# Patient Record
Sex: MALE | Race: WHITE | NOT HISPANIC OR LATINO | Employment: FULL TIME | URBAN - METROPOLITAN AREA
[De-identification: names, ages, dates, MRNs, and addresses within clinical notes are randomized per-mention and may not be internally consistent; named-entity substitution may affect disease eponyms.]

---

## 2018-01-15 NOTE — PROGRESS NOTES
Assessment    1  Screening for depression (V79 0) (Z13 89)   2  GERD (gastroesophageal reflux disease) (530 81) (K21 9)   3  Encounter for screening colonoscopy (V76 51) (Z12 11)    Plan   Encounter for screening colonoscopy    · (1) COMPREHENSIVE METABOLIC PANEL; Status:Active; Requested for:[id number];    · (1) LIPID PANEL FASTING W DIRECT LDL REFLEX; Status:Active; Requested  for:[id number];    · 3 - Sandy Batista MD, [facility]ide  (Gastroenterology) Physician Referral  Consult  Status: Hold For -  Scheduling  Requested for: [id number]  are Referring to a non- Preferred Provider : Established Patient  Care Summary provided  : Yes  Need for prophylactic vaccination and inoculation against influenza    · Fluzone Quadrivalent 0 5 ML Intramuscular Suspension    *VB-Depression Screening; Status:Resulted - Requires Verification,Retrospective Authorization;   Done: [id number] 12:00AM  EFL:[id number]; Last Updated By:Taya Chairez; 12/19/2016 9:25:03 AM;Ordered; For:Screening for depression; Ordered By:Sharron Chan;      Discussion/Summary  Impression: health maintenance visit  Currently, he eats an adequate diet  Chief Complaint  CPE and flu vaccine      History of Present Illness  HM, Adult Male: The patient is being seen for a health maintenance evaluation  Social History: Household members include spouse  He is   Work status: working full time  The patient has never smoked cigarettes  He reports rare alcohol use  General Health: The patient's health since the last visit is described as fair  Screening:      Review of Systems    Constitutional: No fever or chills, feels well, no tiredness, no recent weight gain or weight loss  Eyes: No complaints of eye pain, no red eyes, no discharge from eyes, no itchy eyes  ENT: no complaints of earache, no hearing loss, no nosebleeds, no nasal discharge, no sore throat, no hoarseness     Cardiovascular: No complaints of slow heart rate, no fast heart rate, no chest pain, no palpitations, no leg claudication, no lower extremity  Respiratory: No complaints of shortness of breath, no wheezing, no cough, no SOB on exertion, no orthopnea or PND  Active Problems    1  Abdominal pain (789 00) (R10 9)   2  Abrasion Or Friction Burn (919 0)   3  Acute bronchitis (466 0) (J20 9)   4  Acute sinusitis (461 9) (J01 90)   5  Allergic rhinitis (477 9) (J30 9)   6  Allergic urticaria (708 0) (L50 0)   7  Ankle pain, unspecified laterality   8  Ankle Sprain (845 00)   9  Arthralgia of left ankle or foot (719 47) (M25 572)   10  Arthralgia Of The Humerus / Elbow (719 42)   11  Arthralgia Of The Ulna / Radius / Wrist (719 43)   12  Chronic rhinitis (472 0) (J31 0)   13  Dermatitis (692 9) (L30 9)   14  Eczema (692 9) (L30 9)   15  Encounter for screening colonoscopy (V76 51) (Z12 11)   16  Fecal soiling (787 62) (R15 1)   17  GERD (gastroesophageal reflux disease) (530 81) (K21 9)   18  Internal Hemorrhoids (455 0)   19  Need for prophylactic vaccination and inoculation against influenza (V04 81) (Z23)   20  Obstructive sleep apnea (327 23) (G47 33)   21  Penis disorder (607 9) (N48 9)   22  Preoperative clearance (V72 84) (Z01 818)   23  Screening for depression (V79 0) (Z13 89)   24  Surgical procedure planned    Family History  Mother    · No pertinent family history  Family History    · Family history of Thyroid Disorder (V18 19)    Social History    · Denied: History of Alcohol Use (History)   · Denied: History of Drug Use   · Never A Smoker    Current Meds   1  No Reported Medications  Requested for: 11Aug2016 Recorded    Allergies    1  No Known Drug Allergies    2   No Known Latex Allergies    Vitals   Recorded: 03TEZ7207 05:36PM   Temperature 97 4 F   Heart Rate 66   Respiration 18   Systolic 437   Diastolic 70   Height 5 ft 9 in   Weight 267 lb 2 oz   BMI Calculated 39 45   BSA Calculated 2 34   O2 Saturation 97     Physical Exam    Constitutional   General appearance: No acute distress, well appearing and well nourished  Head and Face   Head and face: Normal     Palpation of the face and sinuses: No sinus tenderness  Eyes   Conjunctiva and lids: No erythema, swelling or discharge  Ears, Nose, Mouth, and Throat   External inspection of ears and nose: Normal     Otoscopic examination: Tympanic membranes translucent with normal light reflex  Canals patent without erythema  Hearing: Normal     Nasal mucosa, septum, and turbinates: Normal without edema or erythema  Lips, teeth, and gums: Normal, good dentition  Oropharynx: Normal with no erythema, edema, exudate or lesions  Neck   Thyroid: Normal, no thyromegaly  Pulmonary   Respiratory effort: No increased work of breathing or signs of respiratory distress  Auscultation of lungs: Clear to auscultation  Cardiovascular   Auscultation of heart: Normal rate and rhythm, normal S1 and S2, no murmurs  Abdomen   Abdomen: Non-tender, no masses  Liver and spleen: No hepatomegaly or splenomegaly  Anus, perineum, and rectum: Normal sphincter tone, no masses, no prolapse  Stool sample for occult blood: Negative  The stool was negative for occult blood  Genitourinary   Scrotal contents: Normal testes, no masses  Penis: Normal, no lesions  Digital rectal exam of prostate: Normal size, no masses  Results/Data  PHQ-2 Adult Depression Screening 86THK6928 05:25PM User, s     Test Name Result Flag Reference   PHQ-2 Adult Depression Score 0     Over the last two weeks, how often have you been bothered by any of the following problems? Little interest or pleasure in doing things: Not at all - 0  Feeling down, depressed, or hopeless: Not at all - 0   PHQ-2 Adult Depression Screening Negative         Signatures   Electronically signed by :  SANDHYA Clifford ; Dec 19 2016  8:23PM EST                       (Author)

## 2018-06-28 ENCOUNTER — OFFICE VISIT (OUTPATIENT)
Dept: FAMILY MEDICINE CLINIC | Facility: CLINIC | Age: 58
End: 2018-06-28
Payer: COMMERCIAL

## 2018-06-28 VITALS
HEART RATE: 77 BPM | SYSTOLIC BLOOD PRESSURE: 150 MMHG | DIASTOLIC BLOOD PRESSURE: 86 MMHG | HEIGHT: 69 IN | RESPIRATION RATE: 20 BRPM | WEIGHT: 280 LBS | OXYGEN SATURATION: 97 % | BODY MASS INDEX: 41.47 KG/M2

## 2018-06-28 DIAGNOSIS — M25.562 CHRONIC PAIN OF LEFT KNEE: Primary | ICD-10-CM

## 2018-06-28 DIAGNOSIS — G89.29 CHRONIC PAIN OF LEFT KNEE: Primary | ICD-10-CM

## 2018-06-28 PROCEDURE — 99213 OFFICE O/P EST LOW 20 MIN: CPT | Performed by: FAMILY MEDICINE

## 2018-06-28 PROCEDURE — 3008F BODY MASS INDEX DOCD: CPT | Performed by: FAMILY MEDICINE

## 2018-06-28 RX ORDER — NABUMETONE 500 MG/1
500 TABLET, FILM COATED ORAL 2 TIMES DAILY
Qty: 20 TABLET | Refills: 0 | Status: SHIPPED | OUTPATIENT
Start: 2018-06-28 | End: 2018-10-31 | Stop reason: SDUPTHER

## 2018-06-28 RX ORDER — MELOXICAM 15 MG/1
15 TABLET ORAL DAILY
Refills: 0 | COMMUNITY
Start: 2018-06-11 | End: 2018-06-28

## 2018-06-29 NOTE — PROGRESS NOTES
Assessment/Plan:    No problem-specific Assessment & Plan notes found for this encounter  Diagnoses and all orders for this visit:    Chronic pain of left knee  -     MRI knee left  wo contrast; Future  -     nabumetone (RELAFEN) 500 mg tablet; Take 1 tablet (500 mg total) by mouth 2 (two) times a day    Other orders  -     Discontinue: meloxicam (MOBIC) 15 mg tablet; Take 15 mg by mouth daily          Subjective:      Patient ID: Collins Larios is a 62 y o  male      About 4-5 months ago Eugenia Hamm was walking in next step to enter into a hole in the ground on his in-laws farm he's had problems with his knee ever since then recently it has gotten more painful with trouble with extension no buckling however no swelling and no redness  Did go to an urgent care they gave him Mobic and that has not relieved any of the discomfort        The following portions of the patient's history were reviewed and updated as appropriate: past family history, past social history, past surgical history and problem list     Review of Systems   Musculoskeletal:        Please see chief complaint         Objective:      /86   Pulse 77   Resp 20   Ht 5' 9" (1 753 m)   Wt 127 kg (280 lb)   SpO2 97%   BMI 41 35 kg/m²          Physical Exam   Musculoskeletal:   Patient has limited range of motion in extension and he has a positive Boyd's test my concern is the fraying or partial tear of the medial meniscus

## 2018-07-02 ENCOUNTER — TELEPHONE (OUTPATIENT)
Dept: FAMILY MEDICINE CLINIC | Facility: CLINIC | Age: 58
End: 2018-07-02

## 2018-07-02 NOTE — TELEPHONE ENCOUNTER
DR Sandi Vazquez - [facility] [employer] IS REQUESTING A PEER TO PEER WITH YOU FOR AUTH FOR MRI KNEE   THEY ARE LOOKING FOR MORE CLINICAL INFO, CONSERVATIVE TREATMENT, XRAYS, ORTHO EVAL, ETC   PHONE NO  IS [phone], CASE NO  [id number]  CAN SOMEONE PLEASE TAKE CARE OF THIS

## 2018-07-05 ENCOUNTER — TELEPHONE (OUTPATIENT)
Dept: FAMILY MEDICINE CLINIC | Facility: CLINIC | Age: 58
End: 2018-07-05

## 2018-07-05 NOTE — TELEPHONE ENCOUNTER
DR Michelle Parikh [employer] [employer] DENIED HIS MRI  THEY ARE REQUESTING AN XRAY, CONSERVATIVE TREATMENT, ORTHO EVAL     WHAT DO YOU WANT TO DO

## 2018-10-31 ENCOUNTER — OFFICE VISIT (OUTPATIENT)
Dept: FAMILY MEDICINE CLINIC | Facility: CLINIC | Age: 58
End: 2018-10-31
Payer: COMMERCIAL

## 2018-10-31 VITALS
OXYGEN SATURATION: 97 % | SYSTOLIC BLOOD PRESSURE: 110 MMHG | WEIGHT: 279.38 LBS | RESPIRATION RATE: 20 BRPM | TEMPERATURE: 97.2 F | BODY MASS INDEX: 41.26 KG/M2 | HEART RATE: 65 BPM | DIASTOLIC BLOOD PRESSURE: 72 MMHG

## 2018-10-31 DIAGNOSIS — Z23 NEED FOR IMMUNIZATION AGAINST INFLUENZA: Primary | ICD-10-CM

## 2018-10-31 DIAGNOSIS — G89.29 CHRONIC PAIN OF LEFT KNEE: ICD-10-CM

## 2018-10-31 DIAGNOSIS — M25.562 CHRONIC PAIN OF LEFT KNEE: ICD-10-CM

## 2018-10-31 PROCEDURE — 1036F TOBACCO NON-USER: CPT | Performed by: FAMILY MEDICINE

## 2018-10-31 PROCEDURE — 99213 OFFICE O/P EST LOW 20 MIN: CPT | Performed by: FAMILY MEDICINE

## 2018-10-31 PROCEDURE — 90471 IMMUNIZATION ADMIN: CPT | Performed by: FAMILY MEDICINE

## 2018-10-31 PROCEDURE — 90682 RIV4 VACC RECOMBINANT DNA IM: CPT | Performed by: FAMILY MEDICINE

## 2018-10-31 RX ORDER — NABUMETONE 500 MG/1
500 TABLET, FILM COATED ORAL 2 TIMES DAILY
Qty: 20 TABLET | Refills: 0 | Status: SHIPPED | OUTPATIENT
Start: 2018-10-31 | End: 2019-03-05 | Stop reason: ALTCHOICE

## 2018-10-31 RX ORDER — FLUTICASONE PROPIONATE 50 MCG
SPRAY, SUSPENSION (ML) NASAL
COMMUNITY
Start: 2018-10-30

## 2018-10-31 RX ORDER — FLUTICASONE PROPIONATE AND SALMETEROL XINAFOATE 115; 21 UG/1; UG/1
AEROSOL, METERED RESPIRATORY (INHALATION)
Refills: 1 | COMMUNITY
Start: 2018-10-16 | End: 2021-05-14 | Stop reason: ALTCHOICE

## 2018-10-31 RX ORDER — FEXOFENADINE HCL AND PSEUDOEPHEDRINE HCI 60; 120 MG/1; MG/1
1 TABLET, EXTENDED RELEASE ORAL 2 TIMES DAILY
COMMUNITY
End: 2021-05-14 | Stop reason: ALTCHOICE

## 2018-11-01 NOTE — PROGRESS NOTES
Assessment/Plan:    No problem-specific Assessment & Plan notes found for this encounter  Diagnoses and all orders for this visit:    Need for immunization against influenza  -     influenza vaccine, [id number], quadrivalent, recombinant, PF, 0 5 mL, for patients 18 yr+ (FLUBLOK)    Chronic pain of left knee  -     nabumetone (RELAFEN) 500 mg tablet; Take 1 tablet (500 mg total) by mouth 2 (two) times a day    Other orders  -     fluticasone (FLONASE) 50 mcg/act nasal spray;   -     ADVAIR -21 MCG/ACT inhaler; INHALE 2 PUFFS EVERY 12 HOURS AS DIRECTED  -     fexofenadine-pseudoephedrine (ALLEGRA-D)  MG per tablet; Take 1 tablet by mouth 2 (two) times a day          Subjective:      Patient ID: Ayaka Salgado is a 62 y o  male  Paxton Moncada is here for left knee pain again this dates back to an injury months ago where he was walking and fell into a hole he had a lot of significant pain back then now the pain is better he has to walk about an hour and then the pain does return level of 5 but other than that he is not bothered by it there is no swelling we went over a couple of options he could be referred to Ortho for an injection another course of anti-inflammatories  At this point he opts for course of anti-inflammatories and then if there is initial he will let me now he also slipped and fell yesterday and hit his head on a garbage can does have an abrasion on his scalp that he just wants me to look at  He did not lose consciousness  He has no headache numbness paresthesias cognitive dysfunction        The following portions of the patient's history were reviewed and updated as appropriate: current medications, past family history, past medical history, past social history, past surgical history and problem list     Review of Systems   Constitutional: Negative  HENT: Negative  Eyes: Negative  Respiratory: Negative  Cardiovascular: Negative  Endocrine: Negative      Genitourinary: Negative  Objective:      /72 (BP Location: Left arm, Patient Position: Sitting)   Pulse 65   Temp (!) 97 2 °F (36 2 °C) (Tympanic)   Resp 20   Wt 127 kg (279 lb 6 oz)   SpO2 97%   BMI 41 26 kg/m²          Physical Exam   Constitutional: He is oriented to person, place, and time  He appears well-developed and well-nourished  HENT:   Head: Normocephalic and atraumatic  Cardiovascular: Normal rate and regular rhythm  Pulmonary/Chest: Effort normal and breath sounds normal    Musculoskeletal:   Patient is full range of motion of the knee he does have tenderness along the medial plateau but the ligamentous status seems to be intact   Neurological: He is alert and oriented to person, place, and time

## 2019-03-05 ENCOUNTER — OFFICE VISIT (OUTPATIENT)
Dept: FAMILY MEDICINE CLINIC | Facility: CLINIC | Age: 59
End: 2019-03-05
Payer: COMMERCIAL

## 2019-03-05 ENCOUNTER — TRANSCRIBE ORDERS (OUTPATIENT)
Dept: ADMINISTRATIVE | Facility: HOSPITAL | Age: 59
End: 2019-03-05

## 2019-03-05 ENCOUNTER — HOSPITAL ENCOUNTER (OUTPATIENT)
Dept: RADIOLOGY | Facility: HOSPITAL | Age: 59
Discharge: HOME | End: 2019-03-05
Payer: COMMERCIAL

## 2019-03-05 VITALS
OXYGEN SATURATION: 97 % | BODY MASS INDEX: 41.5 KG/M2 | SYSTOLIC BLOOD PRESSURE: 102 MMHG | RESPIRATION RATE: 18 BRPM | DIASTOLIC BLOOD PRESSURE: 62 MMHG | HEART RATE: 60 BPM | WEIGHT: 281 LBS

## 2019-03-05 DIAGNOSIS — R07.81 RIB PAIN ON RIGHT SIDE: ICD-10-CM

## 2019-03-05 DIAGNOSIS — R07.81 RIB PAIN ON RIGHT SIDE: Primary | ICD-10-CM

## 2019-03-05 PROCEDURE — 71100 X-RAY EXAM RIBS UNI 2 VIEWS: CPT

## 2019-03-05 PROCEDURE — 99213 OFFICE O/P EST LOW 20 MIN: CPT | Performed by: FAMILY MEDICINE

## 2019-03-05 RX ORDER — MONTELUKAST SODIUM 10 MG/1
10 TABLET ORAL DAILY
Refills: 2 | COMMUNITY
Start: 2019-02-19

## 2019-03-05 NOTE — PROGRESS NOTES
Assessment/Plan:    Rib pain on right side  - pain already improving, minimal now, however to r/o rib fracture order rib film  - pt may use otc tylenol prn  - call if any acute worsening and/or respiratory distress and/or CP       Diagnoses and all orders for this visit:    Rib pain on right side  -     XR ribs 2 vw right; Future    Other orders  -     montelukast (SINGULAIR) 10 mg tablet; Take 10 mg by mouth daily             Subjective:      Patient ID: Brionna Flynn is a 62 y o  male  HPI    Pt is here for rib pain on the right side (axillary plane), he has it since 2 weeks; noted he fell in the snow 2 weeks ago, since then has had the pain  2/10, feels like a sore muscle, occasional coughing and movement makes the pain worse, nothing makes the pain better  Does not radiate  Constant pain  Pain has improved since 2 weeks, initially he had whole body pain 2/2 the fall, and since only his rib area and now just a nagging pain  He has tried advil for the pain with no significant relief  No fever  No cough  The following portions of the patient's history were reviewed and updated as appropriate: allergies, current medications, past family history, past medical history, past social history, past surgical history and problem list     Review of Systems   Constitutional: Negative for chills and fever  Respiratory: Negative for shortness of breath and wheezing  Cardiovascular: Negative for chest pain and leg swelling  Gastrointestinal: Negative for abdominal pain, diarrhea, nausea and vomiting  Objective:      /62   Pulse 60   Resp 18   Wt 127 kg (281 lb)   SpO2 97%   BMI 41 50 kg/m²          Physical Exam   Constitutional: No distress  HENT:   Head: Normocephalic and atraumatic  Right Ear: External ear normal    Left Ear: External ear normal    Eyes: Conjunctivae are normal    Neck: Neck supple     Cardiovascular: Normal rate, regular rhythm, normal heart sounds and intact distal pulses  No murmur heard  Pulmonary/Chest: Effort normal and breath sounds normal  No respiratory distress  He has no wheezes  He has no rales  He exhibits no tenderness  Abdominal: Soft  Bowel sounds are normal  He exhibits no distension  There is no tenderness  There is no rebound  Musculoskeletal: He exhibits no edema, tenderness or deformity  Point tenderness to palpation of right side of rbs 5/6th area along the right axillary plane -- no obvious bruising noted   Neurological: He is alert  Skin: Skin is warm and dry  He is not diaphoretic  Psychiatric: He has a normal mood and affect

## 2019-03-07 PROBLEM — R07.81 RIB PAIN ON RIGHT SIDE: Status: ACTIVE | Noted: 2019-03-07

## 2019-03-07 NOTE — ASSESSMENT & PLAN NOTE
- pain already improving, minimal now, however to r/o rib fracture order rib film  - pt may use otc tylenol prn  - call if any acute worsening and/or respiratory distress and/or CP

## 2019-03-12 ENCOUNTER — TELEPHONE (OUTPATIENT)
Dept: FAMILY MEDICINE CLINIC | Facility: CLINIC | Age: 59
End: 2019-03-12

## 2019-03-12 NOTE — TELEPHONE ENCOUNTER
D/w XR findings which are essentially neg, no rib fractures which was what we wanted to rule out;  XR did mention mild arthritis of right shoulder- informed pt about this, at this time he is asymptomatic, may follow if becomes symptomatic

## 2020-11-11 ENCOUNTER — VBI (OUTPATIENT)
Dept: ADMINISTRATIVE | Facility: OTHER | Age: 60
End: 2020-11-11

## 2021-04-08 DIAGNOSIS — Z23 ENCOUNTER FOR IMMUNIZATION: ICD-10-CM

## 2021-04-14 ENCOUNTER — TELEPHONE (OUTPATIENT)
Dept: GASTROENTEROLOGY | Facility: CLINIC | Age: 61
End: 2021-04-14

## 2021-04-14 NOTE — TELEPHONE ENCOUNTER
Received message from pt to schedule colon  Pt is due for recall colon with Dr Jackie Dias for hx of polyps  I returned pt's call, lmom apologizing for missing him and to please call our office back to schedule procedure with Dr Jackie Dias  If pt calls back, please schedule him  Thank you!

## 2021-04-15 NOTE — TELEPHONE ENCOUNTER
Pt chapinom informing he missed our call  I returned pt's call, lmom apologizing for missing him yesterday  If pt calls back, please schedule him  Thank you!

## 2021-04-19 ENCOUNTER — TELEPHONE (OUTPATIENT)
Dept: GASTROENTEROLOGY | Facility: CLINIC | Age: 61
End: 2021-04-19

## 2021-04-19 ENCOUNTER — PREP FOR PROCEDURE (OUTPATIENT)
Dept: GASTROENTEROLOGY | Facility: CLINIC | Age: 61
End: 2021-04-19

## 2021-04-19 DIAGNOSIS — Z86.010 HISTORY OF COLON POLYPS: Primary | ICD-10-CM

## 2021-04-19 DIAGNOSIS — Z86.010 HX OF COLONIC POLYPS: Primary | ICD-10-CM

## 2021-04-19 RX ORDER — SODIUM, POTASSIUM,MAG SULFATES 17.5-3.13G
177 SOLUTION, RECONSTITUTED, ORAL ORAL SEE ADMIN INSTRUCTIONS
Qty: 2 BOTTLE | Refills: 0 | Status: SHIPPED | OUTPATIENT
Start: 2021-04-19 | End: 2021-06-21

## 2021-05-10 ENCOUNTER — RA CDI HCC (OUTPATIENT)
Dept: OTHER | Facility: HOSPITAL | Age: 61
End: 2021-05-10

## 2021-05-10 NOTE — PROGRESS NOTES
[facility] [place]  coding opportunities             Chart reviewed, (number of) suggestions sent to provider: 1           Patients insurance company: [employer] (Medicare and [employer] for [employer] and [facility])     Visit status: Patient arrived for their scheduled appointment     Provider never responded to [facility] [place]  coding request     [facility] [place]  coding opportunities             Chart reviewed, (number of) suggestions sent to provider: 1  E66 01  Morbid (severe) obesity due to excess calories-BMI of 40 and higher or BMI of 35-39 9 with comorbid condition         Patients insurance company: [employer] (Medicare and [employer] for [employer] and [facility])

## 2021-05-14 ENCOUNTER — OFFICE VISIT (OUTPATIENT)
Dept: FAMILY MEDICINE CLINIC | Facility: OTHER | Age: 61
End: 2021-05-14
Payer: COMMERCIAL

## 2021-05-14 VITALS
OXYGEN SATURATION: 97 % | BODY MASS INDEX: 41.52 KG/M2 | RESPIRATION RATE: 18 BRPM | SYSTOLIC BLOOD PRESSURE: 118 MMHG | WEIGHT: 290 LBS | HEIGHT: 70 IN | HEART RATE: 68 BPM | TEMPERATURE: 98 F | DIASTOLIC BLOOD PRESSURE: 76 MMHG

## 2021-05-14 DIAGNOSIS — Z00.00 ANNUAL PHYSICAL EXAM: Primary | ICD-10-CM

## 2021-05-14 DIAGNOSIS — Z11.3 SCREEN FOR STD (SEXUALLY TRANSMITTED DISEASE): ICD-10-CM

## 2021-05-14 DIAGNOSIS — Z76.89 ENCOUNTER TO ESTABLISH CARE: ICD-10-CM

## 2021-05-14 DIAGNOSIS — Z13.1 SCREENING FOR DIABETES MELLITUS: ICD-10-CM

## 2021-05-14 DIAGNOSIS — Z13.6 SCREENING FOR CARDIOVASCULAR CONDITION: ICD-10-CM

## 2021-05-14 DIAGNOSIS — Z11.59 NEED FOR HEPATITIS C SCREENING TEST: ICD-10-CM

## 2021-05-14 PROBLEM — Z99.89 OSA ON CPAP: Status: ACTIVE | Noted: 2021-05-14

## 2021-05-14 PROBLEM — E66.01 MORBID OBESITY WITH BMI OF 40.0-44.9, ADULT (HCC): Status: ACTIVE | Noted: 2021-05-14

## 2021-05-14 PROBLEM — R07.81 RIB PAIN ON RIGHT SIDE: Status: RESOLVED | Noted: 2019-03-07 | Resolved: 2021-05-14

## 2021-05-14 PROBLEM — M19.90 OSTEOARTHRITIS: Status: ACTIVE | Noted: 2021-05-14

## 2021-05-14 PROBLEM — G47.33 OSA ON CPAP: Status: ACTIVE | Noted: 2021-05-14

## 2021-05-14 PROCEDURE — 99396 PREV VISIT EST AGE 40-64: CPT | Performed by: FAMILY MEDICINE

## 2021-05-14 PROCEDURE — 3008F BODY MASS INDEX DOCD: CPT | Performed by: FAMILY MEDICINE

## 2021-05-14 PROCEDURE — 3725F SCREEN DEPRESSION PERFORMED: CPT | Performed by: FAMILY MEDICINE

## 2021-05-14 PROCEDURE — 1036F TOBACCO NON-USER: CPT | Performed by: FAMILY MEDICINE

## 2021-05-14 RX ORDER — BENZALKONIUM CHLORIDE 1.3 MG/ML
CLOTH TOPICAL
COMMUNITY

## 2021-05-14 NOTE — PATIENT INSTRUCTIONS

## 2021-05-14 NOTE — PROGRESS NOTES
[facility] [place]    NAME: Ron Saldivar  AGE: 61 y o  SEX: male  : 1960     DATE: 2021     Assessment and Plan:     Problem List Items Addressed This Visit     None      Visit Diagnoses     Annual physical exam    -  Primary    Screening for cardiovascular condition        Relevant Orders    Lipid Panel with Direct LDL reflex    Screening for diabetes mellitus        Relevant Orders    Comprehensive metabolic panel    HEMOGLOBIN A1C W/ EAG ESTIMATION    Screen for STD (sexually transmitted disease)        Relevant Orders    HIV 1/2 Antigen/Antibody (4th Generation) w Reflex [facility]    Need for hepatitis C screening test        Relevant Orders    Hepatitis C antibody    Encounter to establish care                Immunizations and preventive care screenings were discussed with patient today  Appropriate education was printed on patient's after visit summary  Counseling:  Alcohol/drug use: discussed moderation in alcohol intake, the recommendations for healthy alcohol use, and avoidance of illicit drug use  Dental Health: discussed importance of regular tooth brushing, flossing, and dental visits  Injury prevention: discussed safety/seat belts, safety helmets, smoke detectors, carbon dioxide detectors, and smoking near bedding or upholstery  Sexual health: discussed sexually transmitted diseases, partner selection, use of condoms, avoidance of unintended pregnancy, and contraceptive alternatives  · Exercise: the importance of regular exercise/physical activity was discussed  Recommend exercise 3-5 times per week for at least 30 minutes  BMI Counseling: Body mass index is 42 21 kg/m²   The BMI is above normal  Nutrition recommendations include decreasing portion sizes, encouraging healthy choices of fruits and vegetables, decreasing fast food intake, consuming healthier snacks, limiting drinks that contain sugar, moderation in carbohydrate intake, reducing intake of saturated and trans fat and reducing intake of cholesterol  Exercise recommendations include moderate physical activity 150 minutes/week and strength training exercises  No pharmacotherapy was ordered  Patient not amenable to meeting with Nutritionist at this time  He states he knows how to eat well and will work on his diet/weight loss  As far as health screenings, patient has colonoscopy scheduled for Monday with Dr Narinder Chi of [facility]  He states that he has received both doses of COVID-19 vaccine  Will check routine labs as listed above  Return in about 1 year (around 5/14/2022) for Annual physical or sooner as needed   The patient indicates understanding of these issues and agrees with the plan  Chief Complaint:     Chief Complaint   Patient presents with   [address] Annual Exam      History of Present Illness:     Adult Annual Physical   Patient here for a comprehensive physical exam  He is here to establish care as his PCP from [facility] recently retired  The patient reports occasional lumbar pain secondary to osteoarthritis- Gets adjustment by Chiropractor once a month which significantly helps       Diet and Physical Activity  · Diet/Nutrition: Well balanced diet but does report eating too much  Limited junk food  Occasional fast food  · Exercise: resistance training daily, but does not do cardio exercise  Depression Screening  PHQ-9 Depression Screening    PHQ-9:   Frequency of the following problems over the past two weeks:      Little interest or pleasure in doing things: 0 - not at all  Feeling down, depressed, or hopeless: 0 - not at all  PHQ-2 Score: 0       General Health  · Sleep: sleeps well and 6-8 hours  Has MARY and wears CPAP nightly   · Hearing: normal - bilateral   · Vision: goes every 2 years  Has appt scheduled for this Spring      · Dental: regular dental visits, brushes teeth three times daily and flosses daily    Health  · Symptoms include: none     Review of Systems:     Review of Systems   Constitutional: Negative for activity change, appetite change, chills, fatigue, fever and unexpected weight change  HENT: Negative for rhinorrhea, sinus pain, sneezing and sore throat  Eyes: Negative for visual disturbance  Respiratory: Negative for chest tightness, shortness of breath and wheezing  Cardiovascular: Negative for chest pain, palpitations and leg swelling  Gastrointestinal: Negative for abdominal pain, blood in stool, constipation, diarrhea, nausea and vomiting  Musculoskeletal: Positive for back pain (  chronic issue )  Neurological: Negative for dizziness, weakness, light-headedness, numbness and headaches  Psychiatric/Behavioral: Negative for sleep disturbance  The patient is not nervous/anxious         Past Medical History:     Past Medical History:   Diagnosis Date    MARY (obstructive sleep apnea)     Osteoarthritis       Past Surgical History:     Past Surgical History:   Procedure Laterality Date    SINUS SURGERY Bilateral 2014      Family History:     Family History   Problem Relation Age of Onset    No Known Problems Mother     Thyroid disease Family       Social History:        Social History     Socioeconomic History    Marital status: /Civil Union     Spouse name: None    Number of children: None    Years of education: None    Highest education level: None   Occupational History    None   Social Needs    Financial resource strain: None    Food insecurity     Worry: None     Inability: None    Transportation needs     Medical: None     Non-medical: None   Tobacco Use    Smoking status: Never Smoker    Smokeless tobacco: Never Used   Substance and Sexual Activity    Alcohol use: No    Drug use: No    Sexual activity: None   Lifestyle    Physical activity     Days per week: None     Minutes per session: None    Stress: None Relationships    Social connections     Talks on phone: None     Gets together: None     Attends Orthodox service: None     Active member of club or organization: None     Attends meetings of clubs or organizations: None     Relationship status: None    Intimate partner violence     Fear of current or ex partner: None     Emotionally abused: None     Physically abused: None     Forced sexual activity: None   Other Topics Concern    None   Social History Narrative    None      Current Medications:     Current Outpatient Medications   Medication Sig Dispense Refill    fluticasone (FLONASE) 50 mcg/act nasal spray       montelukast (SINGULAIR) 10 mg tablet Take 10 mg by mouth daily  2    Respiratory Therapy Supplies (CareTouch CPAP & BIPAP Hose) MISC Use      Na Sulfate-K Sulfate-Mg Sulf (Suprep Bowel Prep Kit) 17 5-3 13-1 6 GM/177ML SOLN Take 177 mL by mouth see administration instructions for 1 day TAKE AS DIRECTED DAY PRIOR TO PROCEDURE 2 Bottle 0     No current facility-administered medications for this visit  Allergies:     No Known Allergies   Physical Exam:     /76   Pulse 68   Temp 98 °F (36 7 °C)   Resp 18   Ht 5' 9 5" (1 765 m)   Wt 132 kg (290 lb)   SpO2 97%   BMI 42 21 kg/m²     Physical Exam  Vitals signs reviewed  Constitutional:       General: He is not in acute distress  Appearance: Normal appearance  He is well-developed  He is obese  He is not ill-appearing, toxic-appearing or diaphoretic  HENT:      Head: Normocephalic and atraumatic  Right Ear: Tympanic membrane, ear canal and external ear normal  There is no impacted cerumen  Left Ear: Tympanic membrane, ear canal and external ear normal  There is no impacted cerumen  Nose: Nose normal  No congestion or rhinorrhea  Mouth/Throat:      Mouth: Mucous membranes are moist       Pharynx: Oropharynx is clear  No oropharyngeal exudate or posterior oropharyngeal erythema     Eyes:      General: No scleral icterus  Right eye: No discharge  Left eye: No discharge  Extraocular Movements: Extraocular movements intact  Conjunctiva/sclera: Conjunctivae normal       Pupils: Pupils are equal, round, and reactive to light  Neck:      Musculoskeletal: Normal range of motion and neck supple  Thyroid: No thyromegaly  Cardiovascular:      Rate and Rhythm: Normal rate and regular rhythm  Pulses: Normal pulses  Heart sounds: Normal heart sounds  Pulmonary:      Effort: Pulmonary effort is normal  No respiratory distress  Breath sounds: Normal breath sounds  No wheezing  Abdominal:      General: Abdomen is flat  Bowel sounds are normal  There is distension (protuberant )  Palpations: Abdomen is soft  Tenderness: There is no abdominal tenderness  Musculoskeletal: Normal range of motion  Right lower leg: No edema  Left lower leg: No edema  Skin:     General: Skin is warm and dry  Capillary Refill: Capillary refill takes less than 2 seconds  Neurological:      General: No focal deficit present  Mental Status: He is alert and oriented to person, place, and time  Cranial Nerves: No cranial nerve deficit  Sensory: No sensory deficit  Motor: No weakness  Coordination: Coordination normal       Gait: Gait normal       Deep Tendon Reflexes: Reflexes normal    Psychiatric:         Mood and Affect: Mood normal          Behavior: Behavior normal          Thought Content:  Thought content normal          Judgment: Judgment normal          Gurjit Valdez MD  [facility]

## 2021-06-18 LAB
ALBUMIN SERPL-MCNC: 4.2 G/DL (ref 3.8–4.9)
ALBUMIN/GLOB SERPL: 1.8 {RATIO} (ref 1.2–2.2)
ALP SERPL-CCNC: 99 IU/L (ref 48–121)
ALT SERPL-CCNC: 26 IU/L (ref 0–44)
AST SERPL-CCNC: 21 IU/L (ref 0–40)
BILIRUB SERPL-MCNC: 0.5 MG/DL (ref 0–1.2)
BUN SERPL-MCNC: 11 MG/DL (ref 8–27)
BUN/CREAT SERPL: 11 (ref 10–24)
CALCIUM SERPL-MCNC: 9.4 MG/DL (ref 8.6–10.2)
CHLORIDE SERPL-SCNC: 103 MMOL/L (ref 96–106)
CHOLEST SERPL-MCNC: 174 MG/DL (ref 100–199)
CO2 SERPL-SCNC: 24 MMOL/L (ref 20–29)
CREAT SERPL-MCNC: 1.01 MG/DL (ref 0.76–1.27)
EST. AVERAGE GLUCOSE BLD GHB EST-MCNC: 111 MG/DL
GLOBULIN SER-MCNC: 2.4 G/DL (ref 1.5–4.5)
GLUCOSE SERPL-MCNC: 81 MG/DL (ref 65–99)
HBA1C MFR BLD: 5.5 % (ref 4.8–5.6)
HCV AB S/CO SERPL IA: <0.1 S/CO RATIO (ref 0–0.9)
HDLC SERPL-MCNC: 45 MG/DL
HIV 1+2 AB+HIV1 P24 AG SERPL QL IA: NON REACTIVE
LDLC SERPL CALC-MCNC: 112 MG/DL (ref 0–99)
POTASSIUM SERPL-SCNC: 4.5 MMOL/L (ref 3.5–5.2)
PROT SERPL-MCNC: 6.6 G/DL (ref 6–8.5)
SL AMB EGFR AFRICAN AMERICAN: 93 ML/MIN/1.73
SL AMB EGFR NON AFRICAN AMERICAN: 80 ML/MIN/1.73
SODIUM SERPL-SCNC: 141 MMOL/L (ref 134–144)
TRIGL SERPL-MCNC: 91 MG/DL (ref 0–149)

## 2021-06-21 ENCOUNTER — TELEPHONE (OUTPATIENT)
Dept: FAMILY MEDICINE CLINIC | Facility: OTHER | Age: 61
End: 2021-06-21

## 2021-06-21 ENCOUNTER — HOSPITAL ENCOUNTER (OUTPATIENT)
Dept: GASTROENTEROLOGY | Facility: AMBULATORY SURGERY CENTER | Age: 61
Discharge: HOME | End: 2021-06-21
Payer: COMMERCIAL

## 2021-06-21 ENCOUNTER — ANESTHESIA (OUTPATIENT)
Dept: GASTROENTEROLOGY | Facility: AMBULATORY SURGERY CENTER | Age: 61
End: 2021-06-21

## 2021-06-21 ENCOUNTER — ANESTHESIA EVENT (OUTPATIENT)
Dept: GASTROENTEROLOGY | Facility: AMBULATORY SURGERY CENTER | Age: 61
End: 2021-06-21

## 2021-06-21 VITALS
SYSTOLIC BLOOD PRESSURE: 109 MMHG | RESPIRATION RATE: 18 BRPM | TEMPERATURE: 97.2 F | BODY MASS INDEX: 42.95 KG/M2 | HEART RATE: 74 BPM | WEIGHT: 290 LBS | DIASTOLIC BLOOD PRESSURE: 67 MMHG | HEIGHT: 69 IN | OXYGEN SATURATION: 97 %

## 2021-06-21 DIAGNOSIS — Z86.010 HISTORY OF COLON POLYPS: ICD-10-CM

## 2021-06-21 PROBLEM — Z86.0101 HX OF ADENOMATOUS COLONIC POLYPS: Status: ACTIVE | Noted: 2021-06-21

## 2021-06-21 PROCEDURE — 45378 DIAGNOSTIC COLONOSCOPY: CPT | Performed by: INTERNAL MEDICINE

## 2021-06-21 PROCEDURE — 00812 ANES LWR INTST SCR COLSC: CPT | Performed by: NURSE ANESTHETIST, CERTIFIED REGISTERED

## 2021-06-21 RX ORDER — PROPOFOL 10 MG/ML
INJECTION, EMULSION INTRAVENOUS AS NEEDED
Status: DISCONTINUED | OUTPATIENT
Start: 2021-06-21 | End: 2021-06-21

## 2021-06-21 RX ORDER — SODIUM CHLORIDE 9 MG/ML
30 INJECTION, SOLUTION INTRAVENOUS CONTINUOUS
Status: DISCONTINUED | OUTPATIENT
Start: 2021-06-21 | End: 2021-06-25 | Stop reason: HOSPADM

## 2021-06-21 RX ORDER — LIDOCAINE HYDROCHLORIDE 10 MG/ML
INJECTION, SOLUTION EPIDURAL; INFILTRATION; INTRACAUDAL; PERINEURAL AS NEEDED
Status: DISCONTINUED | OUTPATIENT
Start: 2021-06-21 | End: 2021-06-21

## 2021-06-21 RX ORDER — AZELASTINE 1 MG/ML
1 SPRAY, METERED NASAL 2 TIMES DAILY
COMMUNITY

## 2021-06-21 RX ORDER — GLYCOPYRROLATE 0.2 MG/ML
INJECTION INTRAMUSCULAR; INTRAVENOUS AS NEEDED
Status: DISCONTINUED | OUTPATIENT
Start: 2021-06-21 | End: 2021-06-21

## 2021-06-21 RX ADMIN — PROPOFOL 50 MG: 10 INJECTION, EMULSION INTRAVENOUS at 09:32

## 2021-06-21 RX ADMIN — LIDOCAINE HYDROCHLORIDE 50 MG: 10 INJECTION, SOLUTION EPIDURAL; INFILTRATION; INTRACAUDAL; PERINEURAL at 09:27

## 2021-06-21 RX ADMIN — PROPOFOL 50 MG: 10 INJECTION, EMULSION INTRAVENOUS at 09:41

## 2021-06-21 RX ADMIN — SODIUM CHLORIDE: 9 INJECTION, SOLUTION INTRAVENOUS at 09:17

## 2021-06-21 RX ADMIN — PROPOFOL 100 MG: 10 INJECTION, EMULSION INTRAVENOUS at 09:27

## 2021-06-21 RX ADMIN — PROPOFOL 50 MG: 10 INJECTION, EMULSION INTRAVENOUS at 09:28

## 2021-06-21 RX ADMIN — PROPOFOL 50 MG: 10 INJECTION, EMULSION INTRAVENOUS at 09:35

## 2021-06-21 RX ADMIN — PROPOFOL 50 MG: 10 INJECTION, EMULSION INTRAVENOUS at 09:29

## 2021-06-21 RX ADMIN — PROPOFOL 30 MG: 10 INJECTION, EMULSION INTRAVENOUS at 09:38

## 2021-06-21 RX ADMIN — GLYCOPYRROLATE 0.2 MG: 0.2 INJECTION INTRAMUSCULAR; INTRAVENOUS at 09:22

## 2021-06-21 RX ADMIN — PROPOFOL 20 MG: 10 INJECTION, EMULSION INTRAVENOUS at 09:40

## 2021-06-21 NOTE — ANESTHESIA PREPROCEDURE EVALUATION
Procedure:  COLONOSCOPY    Relevant Problems   ANESTHESIA (within normal limits)      CARDIO (within normal limits)      MUSCULOSKELETAL   (+) Osteoarthritis      PULMONARY   (+) MARY on CPAP        Physical Exam    Airway    Mallampati score: II  TM Distance: >3 FB  Neck ROM: full     Dental   No notable dental hx     Cardiovascular  Rhythm: regular, Rate: normal, Cardiovascular exam normal    Pulmonary  Wheezes,     Other Findings  Expiratory wheeze left side only      Anesthesia Plan  ASA Score- 3     Anesthesia Type- IV sedation with anesthesia with ASA Monitors  Additional Monitors:   Airway Plan:     Comment: Nasal cannula  Plan Factors-Exercise tolerance (METS): >4 METS  Chart reviewed  EKG reviewed  Imaging results reviewed  Existing labs reviewed  Patient summary reviewed  Patient is not a current smoker  Patient not instructed to abstain from smoking on day of procedure  Patient did not smoke on day of surgery  Obstructive sleep apnea risk education given perioperatively  Induction- intravenous  Postoperative Plan-     Informed Consent- Anesthetic plan and risks discussed with patient  I personally reviewed this patient with the CRNA  Discussed and agreed on the Anesthesia Plan with the CRNA  Jorge Baca

## 2021-06-21 NOTE — TELEPHONE ENCOUNTER
----- Message from Destiney Shannon MD sent at 6/20/2021  8:23 PM EDT -----  Yakelin Cassidy,     All of your lab results are within normal limits  Please follow up as scheduled       Dr aRjat Quintero

## 2021-06-21 NOTE — DISCHARGE INSTRUCTIONS
Colonoscopy   WHAT YOU NEED TO KNOW:   A colonoscopy is a procedure to examine the inside of your colon (intestine) with a scope  Polyps or tissue growths may have been removed during your colonoscopy  It is normal to feel bloated and to have some abdominal discomfort  You should be passing gas  If you have hemorrhoids or you had polyps removed, you may have a small amount of bleeding  DISCHARGE INSTRUCTIONS:   Seek care immediately if:    You have sudden, severe abdominal pain   You have problems swallowing   You have a large amount of black, sticky bowel movements or blood in your bowel movements   You have sudden trouble breathing   You feel weak, lightheaded, or faint or your heart beats faster than normal for you  Contact your healthcare provider if:    You have a fever and chills   You have nausea or are vomiting   Your abdomen is bloated or feels full and hard   You have abdominal pain   You have black, sticky bowel movements or blood in your bowel movements   You have not had a bowel movement for 3 days after your procedure   You have rash or hives   You have questions or concerns about your procedure  Activity:    Do not lift, strain, or run for 24 hours after your procedure   Rest after your procedure  You have been given medicine to relax you  Do not drive or make important decisions until the day after your procedure  Return to your normal activity as directed   Relieve gas and discomfort from bloating by lying on your right side with a heating pad on your abdomen  You may need to take short walks to help the gas move out  Eat small meals until bloating is relieved  Follow up with your healthcare provider as directed: Write down your questions so you remember to ask them during your visits  If you take a blood thinner, please review the specific instructions from your endoscopist about when you should resume it   These can be found in the Recommendation and Your Medication list sections of this After Visit Summary  Sleep Apnea   WHAT YOU NEED TO KNOW:   What is sleep apnea? Sleep apnea is a condition that causes you to stop breathing for 10 seconds or longer during sleep  Obstructive sleep apnea is the most common kind  The muscles and tissues around your throat relax and block air from passing through  Obesity, use of alcohol or cigarettes, or a family history are common causes  Central sleep apnea means your brain does not send signals to the muscles that control breathing  You do not take a breath even though your airway is open  Common causes include medical conditions such as heart failure, being older than 65, or use of opioids  What are the signs and symptoms of sleep apnea? · Snoring loudly, snorting, gasping or choking while you sleep, and waking up suddenly because of these    · A hard time thinking, remembering things, or focusing on your tasks the following day    · Headache or nausea    · Waking up often during the night to urinate    · Feeling sleepy, slow, and tired during the day    How is sleep apnea treated? Your healthcare provider may have you do a sleep study if you have signs or symptoms  Sleep studies may monitor the stages of sleep, oxygen levels, body position, eye movement, and snoring  Treatment depends on the kind of apnea you have  · A machine  may be used to help you get more air during sleep  A mask may be placed over your nose and mouth, or just your nose  The mask is hooked to the machine  You will get air through the mask  ? A continuous positive airway pressure (CPAP) machine  is used to keep your airway open during sleep  The machine blows a gentle stream of air into the mask when you breathe  This helps keep your airway open so you can breathe more regularly  Extra oxygen may be given through the machine      ? A bilevel positive airway pressure (BiPAP) machine  gives air but lowers the pressure when you breathe out  ? An adaptive servo-ventilator  is a machine that only gives air when it senses you are not breathing  · A mouth device  that looks like a mouth guard stops your tongue and other tissues from blocking airflow  · Surgery  may be needed to remove extra tissues that block your mouth, throat, or nose  How can I manage or prevent sleep apnea? · Do not smoke  Nicotine and other chemicals in cigarettes and cigars can cause lung damage  Ask your healthcare provider for information if you currently smoke and need help to quit  E-cigarettes or smokeless tobacco still contain nicotine  Talk to your healthcare provider before you use these products  · Do not drink alcohol or take sedative medicine before you go to sleep  Alcohol and sedatives can relax the muscles and tissues around your throat  This can block the airflow to your lungs  · Maintain a healthy weight  Your healthcare provider can tell you what weight is healthy for you  He or she can help you create a weight loss plan, if needed  The plan will include healthy foods and regular exercise to help you reach your healthy weight  Exercise can also help you sleep and may reduce stress  · Sleep on your side or use pillows designed to prevent sleep apnea  This prevents your tongue or other tissues from blocking your throat  You can also raise the head of your bed  Call your local emergency number (911 in the [address] [facility]) if:   · You have chest pain or trouble breathing  When should I call my doctor? · You feel tired or depressed  · You have trouble staying awake during the day  · You have trouble thinking clearly  · You have questions or concerns about your condition or care  CARE AGREEMENT:   You have the right to help plan your care  Learn about your health condition and how it may be treated  Discuss treatment options with your healthcare providers to decide what care you want to receive   You always have the right to refuse treatment  The above information is an  only  It is not intended as medical advice for individual conditions or treatments  Talk to your doctor, nurse or pharmacist before following any medical regimen to see if it is safe and effective for you  © Copyright [address] Information is for End User's use only and may not be sold, redistributed or otherwise used for commercial purposes  All illustrations and images included in CareNotes® are the copyrighted property of A D A M , Inc  or [address]   Glycopyrrolate (GLYRX-PF, Novaplus Glycopyrrolate, Robinul) - (By injection)   Why this medicine is used:   Treats peptic ulcers  Also used before and during surgery to dry up your mouth, throat, and stomach  Contact a nurse or doctor right away if you have:  · Fast, slow, or uneven heartbeat  · Decrease in how much or how often you urinate  · Confusion or feeling overly excited  · Muscle weakness or stiffness, seizures  · Nausea, vomiting, diarrhea, constipation, or stomach bloating, dry mouth, loss of taste  · Fever, or sweating, vision problems, sensitivity to light     Common side effects:  · Unable to have or keep an erection  © Copyright [employer] 2021 Information is for End User's use only and may not be sold, redistributed or otherwise used for commercial purposes  High Fiber Diet   WHAT YOU NEED TO KNOW:   What is a high-fiber diet? A high-fiber diet includes foods that have a high amount of fiber  Fiber is the part of fruits, vegetables, and grains that is not broken down by your body  Fiber keeps your bowel movements regular  Fiber can also help lower your cholesterol level, control blood sugar in people with diabetes, and relieve constipation  Fiber can also help you control your weight because it helps you feel full faster  Most adults should eat 25 to 35 grams of fiber each day   Talk to your dietitian or healthcare provider about the amount of fiber you need  What foods are good sources of fiber? · Foods with at least 4 grams of fiber per serving:      ? ? to ½ cup of high-fiber cereal (check the nutrition label on the box)    ? ½ cup of blackberries or raspberries    ? 4 dried prunes    ? 1 cooked artichoke    ? ½ cup of cooked legumes, such as lentils, or red, kidney, and butler beans    · Foods with 1 to 3 grams of fiber per serving:      ? 1 slice of whole-wheat, pumpernickel, or rye bread    ? ½ cup of cooked brown rice    ? 4 whole-wheat crackers    ? 1 cup of oatmeal    ? ½ cup of cereal with 1 to 3 grams of fiber per serving (check the nutrition label on the box)    ? 1 small piece of fruit, such as an apple, banana, pear, kiwi, or orange    ? 3 dates    ? ½ cup of canned apricots, fruit cocktail, peaches, or pears    ? ½ cup of raw or cooked vegetables, such as carrots, cauliflower, cabbage, spinach, squash, or corn  What are some ways that I can increase fiber in my diet? · Choose brown or wild rice instead of white rice  · Use whole wheat flour in recipes instead of white or all-purpose flour  · Add beans and peas to casseroles or soups  · Choose fresh fruit and vegetables with peels or skins on instead of juices  What other guidelines should I follow? · Add fiber to your diet slowly  You may have abdominal discomfort, bloating, and gas if you add fiber to your diet too quickly  · Drink plenty of liquids as you add fiber to your diet  You may have nausea or develop constipation if you do not drink enough water  Ask how much liquid to drink each day and which liquids are best for you  CARE AGREEMENT:   You have the right to help plan your care  Discuss treatment options with your healthcare provider to decide what care you want to receive  You always have the right to refuse treatment  The above information is an  only   It is not intended as medical advice for individual conditions or treatments  Talk to your doctor, nurse or pharmacist before following any medical regimen to see if it is safe and effective for you  © Copyright [address] Information is for End User's use only and may not be sold, redistributed or otherwise used for commercial purposes  All illustrations and images included in CareNotes® are the copyrighted property of A D A M , Inc  or [employer]   Diverticulosis   WHAT YOU NEED TO KNOW:   What is diverticulosis? Diverticulosis is a condition that causes small pockets called diverticula to form in your intestine  These pockets make it difficult for bowel movements to pass through your digestive system  What causes diverticulosis? Diverticula form when muscles have to work hard to move bowel movements through the intestine  The force causes bulges to form at weak areas in the intestine  This may happen if you eat foods that are low in fiber  Fiber helps give your bowel movements more bulk so they are larger and easier to move through your colon  The following may increase your risk of diverticulosis:  · A history of constipation    · Age 36 or older    · Obesity    · Lack of exercise    What are the signs and symptoms of diverticulosis? Diverticulosis usually does not cause any signs or symptoms  It may cause any of the following in some people:  · Pain or discomfort in your lower abdomen    · Abdominal bloating    · Constipation or diarrhea    How is diverticulosis diagnosed? Your healthcare provider will examine you and ask about your bowel movements, diet, and symptoms  He or she will also ask about any medical conditions you have or medicines you take  You may need any of the following:  · Blood tests  may be done to check for signs of inflammation  · A barium enema  is an x-ray of your colon that may show diverticula  A tube is put into your anus, and a liquid called barium is put through the tube   Barium is used so that healthcare providers can see your colon more clearly  · Flexible sigmoidoscopy  is a test to look for any changes in your lower intestines and rectum  It may also show the cause of any bleeding or pain  A soft, bendable tube with a light on the end will be put into your anus  It will then be moved forward into your intestine  · A colonoscopy  is used to look at your whole colon  A scope (long bendable tube with a light on the end) is used to take pictures  This test may show diverticula  · A CT scan , or CAT scan, may show diverticula  You may be given contrast liquid before the scan  Tell the healthcare provider if you have ever had an allergic reaction to contrast liquid  How is diverticulosis managed? The goal of treatment is to manage any symptoms you have and prevent other problems such as diverticulitis  Diverticulitis is swelling or infection of the diverticula  Your healthcare provider may recommend any of the following:  · Eat a variety of high-fiber foods  High-fiber foods help you have regular bowel movements  High-fiber foods include cooked beans, fruits, vegetables, and some cereals  Most adults need 25 to 35 grams of fiber each day  Your healthcare provider may recommend that you have more  Ask your healthcare provider how much fiber you need  Increase fiber slowly  You may have abdominal discomfort, bloating, and gas if you add fiber to your diet too quickly  You may need to take a fiber supplement if you are not getting enough fiber from food  · Medicines  to soften your bowel movements may be given  You may also need medicines to treat symptoms such as bloating and pain  · Drink liquids as directed  You may need to drink 2 to 3 liters (8 to 12 cups) of liquids every day  Ask your healthcare provider how much liquid to drink each day and which liquids are best for you  · Apply heat  on your abdomen for 20 to 30 minutes every 2 hours for as many days as directed   Heat helps decrease pain and muscle spasms  How can I help prevent diverticulitis or other symptoms? The following may help decrease your risk for diverticulitis or symptoms, such as bleeding  Talk to your provider about these or other things you can do to prevent problems that may occur with diverticulosis  · Exercise regularly  Ask your healthcare provider about the best exercise plan for you  Exercise can help you have regular bowel movements  Get 30 minutes of exercise on most days of the week  · Maintain a healthy weight  Ask your healthcare provider how much you should weigh  Ask him or her to help you create a weight loss plan if you are overweight  · Do not smoke  Nicotine and other chemicals in cigarettes increase your risk for diverticulitis  Ask your healthcare provider for information if you currently smoke and need help to quit  E-cigarettes or smokeless tobacco still contain nicotine  Talk to your healthcare provider before you use these products  · Ask your healthcare provider if it is safe to take NSAIDs  NSAIDs may increase your risk of diverticulitis  When should I seek immediate care? · You have severe pain on the left side of your lower abdomen  · Your bowel movements are bright or dark red  When should I contact my healthcare provider? · You have a fever and chills  · You feel dizzy or lightheaded  · You have nausea, or you are vomiting  · You have a change in your bowel movements  · You have questions or concerns about your condition or care  CARE AGREEMENT:   You have the right to help plan your care  Learn about your health condition and how it may be treated  Discuss treatment options with your healthcare providers to decide what care you want to receive  You always have the right to refuse treatment  The above information is an  only  It is not intended as medical advice for individual conditions or treatments   Talk to your doctor, nurse or pharmacist before following any medical regimen to see if it is safe and effective for you  © Copyright [address] Information is for End User's use only and may not be sold, redistributed or otherwise used for commercial purposes  All illustrations and images included in CareNotes® are the copyrighted property of A D A M , Inc  or [employer]   Hemorrhoids   WHAT YOU NEED TO KNOW:   What are hemorrhoids? Hemorrhoids are swollen blood vessels inside your rectum (internal hemorrhoids) or on your anus (external hemorrhoids)  Sometimes a hemorrhoid may prolapse  This means it extends out of your anus  What increases my risk for hemorrhoids? · Pregnancy or obesity    · Straining or sitting for a long time during bowel movements    · Liver disease    · Weak muscles around the anus caused by older age, rectal surgery, or anal intercourse    · A lack of physical activity    · Chronic diarrhea or constipation    · A low-fiber diet    What are the signs and symptoms of hemorrhoids? · Pain or itching around your anus or inside your rectum    · Swelling or bumps around your anus    · Bright red blood in your bowel movement, on the toilet paper, or in the toilet bowl    · Tissue bulging out of your anus (prolapsed hemorrhoids)    · Incontinence (poor control over urine or bowel movements)    How are hemorrhoids diagnosed? Your healthcare provider will ask about your symptoms, the foods you eat, and your bowel movements  He or she will examine your anus for external hemorrhoids  You may need the following:  · A digital rectal exam  is a test to check for hemorrhoids  Your healthcare provider will put a gloved finger inside your anus to feel for the hemorrhoids  · An anoscopy  is a test that uses a scope (small tube with a light and camera on the end) to look at your hemorrhoids  How are hemorrhoids treated? Treatment will depend on your symptoms   You may need any of the following:  · Medicines  can help decrease pain and swelling, and soften your bowel movement  The medicine may be a pill, pad, cream, or ointment  · Procedures  may be used to shrink or remove your hemorrhoid  Examples include rubber-band ligation, sclerotherapy, and photocoagulation  These procedures may be done in your healthcare provider's office  Ask your healthcare provider for more information about these procedures  · Surgery  may be needed to shrink or remove your hemorrhoids  How can I manage my symptoms? · Apply ice on your anus for 15 to 20 minutes every hour or as directed  Use an ice pack, or put crushed ice in a plastic bag  Cover it with a towel before you apply it to your anus  Ice helps prevent tissue damage and decreases swelling and pain  · Take a sitz bath  Fill a bathtub with 4 to 6 inches of warm water  You may also use a sitz bath pan that fits inside a toilet bowl  Sit in the sitz bath for 15 minutes  Do this 3 times a day, and after each bowel movement  The warm water can help decrease pain and swelling  · Keep your anal area clean  Gently wash the area with warm water daily  Soap may irritate the area  After a bowel movement, wipe with moist towelettes or wet toilet paper  Dry toilet paper can irritate the area  How can I help prevent hemorrhoids? · Do not strain to have a bowel movement  Do not sit on the toilet too long  These actions can increase pressure on the tissues in your rectum and anus  · Drink plenty of liquids  Liquids can help prevent constipation  Ask how much liquid to drink each day and which liquids are best for you  · Eat a variety of high-fiber foods  Examples include fruits, vegetables, and whole grains  Ask your healthcare provider how much fiber you need each day  You may need to take a fiber supplement  · Exercise as directed  Exercise, such as walking, may make it easier to have a bowel movement  Ask your healthcare provider to help you create an exercise plan  · Do not have anal sex  Anal sex can weaken the skin around your rectum and anus  · Avoid heavy lifting  This can cause straining and increase your risk for another hemorrhoid  When should I seek immediate care? · You have severe pain in your rectum or around your anus  · You have severe pain in your abdomen and you are vomiting  · You have bleeding from your anus that soaks through your underwear  When should I contact my healthcare provider? · You have frequent and painful bowel movements  · Your hemorrhoid looks or feels more swollen than usual      · You do not have a bowel movement for 2 days or more  · You see or feel tissue coming through your anus  · You have questions or concerns about your condition or care  CARE AGREEMENT:   You have the right to help plan your care  Learn about your health condition and how it may be treated  Discuss treatment options with your healthcare providers to decide what care you want to receive  You always have the right to refuse treatment  The above information is an  only  It is not intended as medical advice for individual conditions or treatments  Talk to your doctor, nurse or pharmacist before following any medical regimen to see if it is safe and effective for you  © Copyright [address] Information is for End User's use only and may not be sold, redistributed or otherwise used for commercial purposes   All illustrations and images included in CareNotes® are the copyrighted property of A D A M , Inc  or [address] [place], [place] [address]

## 2021-06-21 NOTE — H&P
History and Physical - [facility]  Cierra Kilpatrick 61 y o  male MRN: [id number]                  HPI: Cierra Kilpatrick is a 61y o  year old male who presents for colonoscopy  History of tubular adenomas of the colon      REVIEW OF SYSTEMS: Per the HPI, and otherwise unremarkable  Historical Information   Past Medical History:   Diagnosis Date    Colon polyp     CPAP (continuous positive airway pressure) dependence     MARY (obstructive sleep apnea)     Osteoarthritis      Past Surgical History:   Procedure Laterality Date    COLONOSCOPY      SINUS SURGERY Bilateral 2014     Social History   Social History     Substance and Sexual Activity   Alcohol Use No     Social History     Substance and Sexual Activity   Drug Use No     Social History     Tobacco Use   Smoking Status Never Smoker   Smokeless Tobacco Never Used     Family History   Problem Relation Age of Onset    No Known Problems Mother     Thyroid disease Family        Meds/Allergies       Current Outpatient Medications:     azelastine (ASTELIN) 0 1 % nasal spray    fluticasone (FLONASE) 50 mcg/act nasal spray    Respiratory Therapy Supplies (CareTouch CPAP & BIPAP Hose) MISC    montelukast (SINGULAIR) 10 mg tablet    Current Facility-Administered Medications:     sodium chloride 0 9 % infusion, 30 mL/hr, Intravenous, Continuous    Facility-Administered Medications Ordered in Other Encounters:     glycopyrrolate (ROBINUL) injection, , Intravenous, PRN, 0 2 mg at 06/21/21 6659    No Known Allergies    Objective     /70   Pulse 66   Temp (!) 97 2 °F (36 2 °C) (Skin)   Resp 18   Ht 5' 9" (1 753 m)   Wt 132 kg (290 lb)   SpO2 96%   BMI 42 83 kg/m²       PHYSICAL EXAM    Gen: NAD  Head: NCAT  CV: RRR  CHEST: Clear  ABD: soft, NT/ND  EXT: no edema      ASSESSMENT/PLAN:  This is a 61y o  year old male here for colonoscopy, and he is stable and optimized for his procedure

## 2021-12-04 ENCOUNTER — IMMUNIZATIONS (OUTPATIENT)
Dept: FAMILY MEDICINE CLINIC | Facility: HOSPITAL | Age: 61
End: 2021-12-04

## 2021-12-04 DIAGNOSIS — Z23 ENCOUNTER FOR IMMUNIZATION: Primary | ICD-10-CM

## 2021-12-04 PROCEDURE — 0001A COVID-19 PFIZER VACC 0.3 ML: CPT

## 2021-12-04 PROCEDURE — 91300 COVID-19 PFIZER VACC 0.3 ML: CPT

## 2021-12-17 ENCOUNTER — TELEPHONE (OUTPATIENT)
Dept: FAMILY MEDICINE CLINIC | Facility: OTHER | Age: 61
End: 2021-12-17

## 2021-12-20 ENCOUNTER — IMMUNIZATIONS (OUTPATIENT)
Dept: FAMILY MEDICINE CLINIC | Facility: OTHER | Age: 61
End: 2021-12-20
Payer: COMMERCIAL

## 2021-12-20 DIAGNOSIS — Z23 NEED FOR VACCINATION: Primary | ICD-10-CM

## 2021-12-20 PROCEDURE — 90471 IMMUNIZATION ADMIN: CPT

## 2021-12-20 PROCEDURE — 90682 RIV4 VACC RECOMBINANT DNA IM: CPT

## 2022-04-21 ENCOUNTER — OFFICE VISIT (OUTPATIENT)
Dept: GASTROENTEROLOGY | Facility: CLINIC | Age: 62
End: 2022-04-21
Payer: COMMERCIAL

## 2022-04-21 ENCOUNTER — TELEPHONE (OUTPATIENT)
Dept: GASTROENTEROLOGY | Facility: CLINIC | Age: 62
End: 2022-04-21

## 2022-04-21 VITALS
BODY MASS INDEX: 28.14 KG/M2 | DIASTOLIC BLOOD PRESSURE: 67 MMHG | HEART RATE: 66 BPM | HEIGHT: 69 IN | WEIGHT: 190 LBS | SYSTOLIC BLOOD PRESSURE: 120 MMHG

## 2022-04-21 DIAGNOSIS — R12 HEARTBURN: ICD-10-CM

## 2022-04-21 DIAGNOSIS — Z12.11 SCREENING FOR COLON CANCER: ICD-10-CM

## 2022-04-21 DIAGNOSIS — K21.9 GASTROESOPHAGEAL REFLUX DISEASE WITHOUT ESOPHAGITIS: Primary | ICD-10-CM

## 2022-04-21 DIAGNOSIS — K57.90 DIVERTICULOSIS: ICD-10-CM

## 2022-04-21 DIAGNOSIS — K64.9 HEMORRHOIDS, UNSPECIFIED HEMORRHOID TYPE: ICD-10-CM

## 2022-04-21 PROCEDURE — 99214 OFFICE O/P EST MOD 30 MIN: CPT | Performed by: NURSE PRACTITIONER

## 2022-04-21 PROCEDURE — 1036F TOBACCO NON-USER: CPT | Performed by: NURSE PRACTITIONER

## 2022-04-21 PROCEDURE — 3008F BODY MASS INDEX DOCD: CPT | Performed by: NURSE PRACTITIONER

## 2022-04-21 RX ORDER — PANTOPRAZOLE SODIUM 40 MG/1
40 TABLET, DELAYED RELEASE ORAL DAILY
Qty: 30 TABLET | Refills: 5 | Status: SHIPPED | OUTPATIENT
Start: 2022-04-21 | End: 2022-05-21

## 2022-04-21 NOTE — TELEPHONE ENCOUNTER
Scheduled date of EGD(as of today): 6/27/22  Physician performing EGD: Makayla Zhou  Location of EGD: [facility]  Instructions reviewed with patient by: Bibi St  Clearances: None

## 2022-04-21 NOTE — PROGRESS NOTES
[facility] - Outpatient Follow-up Note  Fredrich Cockayne 64 y o  male MRN: [id number]  Encounter: [id number]          ASSESSMENT AND PLAN:      1  Gastroesophageal reflux disease without esophagitis  5  Heartburn  Patient reports acid reflux associated with heartburn and coughing which has been on going over the past year but recently increased in severity  -Follow antireflux diet and measures   -Start pantoprazole (PROTONIX) 40 mg tablet; Take 1 tablet (40 mg total) by mouth daily Take daily in a m  1/2  prior to breakfast  Dispense: 30 tablet; Refill: 5  - EGD; schedule for EGD  Prep and procedure explained to patient in detail  Further recommendations pending results of EGD  2  Screening for colon cancer  Up to date    3  Diverticulosis  4  Hemorrhoids, unspecified hemorrhoid type  History of diverticulosis and hemorrhoids  Patient currently denies any lower GI issues  Denies blood in stool or blood from rectal area  No report of rectal pain  Follow up after EGD      ______________________________________________________________________    SUBJECTIVE:  This is a 64year old male who present to office for GERD  Patient reports acid reflux associated with heartburn and coughing which has been on going over the past year but recently increased in severity  Patient denies nausea, vomiting, dysphagia, epigastric or abdomen pain  Patient denies blood in stool, blood from rectal area, or black tarry stool  Bowel movements regular  Abdomen exam benign, no tenderness or guarding  Last colonoscopy 06/21/2021 which showed History of tubular adenomas no polyps today left-sided diverticulosis and mixed hemorrhoids  Patient never had an EGD  Patient does not smoke or drink alcohol  No family history of gastric or colon cancer        REVIEW OF SYSTEMS IS OTHERWISE NEGATIVE         Historical Information   Past Medical History:   Diagnosis Date    Colon polyp     CPAP (continuous positive airway pressure) dependence     MARY (obstructive sleep apnea)     Osteoarthritis      Past Surgical History:   Procedure Laterality Date    COLONOSCOPY      SINUS SURGERY Bilateral 2014     Social History   Social History     Substance and Sexual Activity   Alcohol Use No     Social History     Substance and Sexual Activity   Drug Use No     Social History     Tobacco Use   Smoking Status Never Smoker   Smokeless Tobacco Never Used     Family History   Problem Relation Age of Onset    No Known Problems Mother     Thyroid disease Family        Meds/Allergies       Current Outpatient Medications:     azelastine (ASTELIN) 0 1 % nasal spray    Respiratory Therapy Supplies (CareTouch CPAP & BIPAP Hose) MISC    fluticasone (FLONASE) 50 mcg/act nasal spray    montelukast (SINGULAIR) 10 mg tablet    pantoprazole (PROTONIX) 40 mg tablet    No Known Allergies        Objective     Blood pressure 120/67, pulse 66, height 5' 9" (1 753 m), weight 86 2 kg (190 lb)  Body mass index is 28 06 kg/m²  PHYSICAL EXAM:      General Appearance:   Alert, cooperative, no distress   HEENT:   Normocephalic, atraumatic, anicteric      Neck:  Supple, symmetrical, trachea midline   Lungs:   Clear to auscultation bilaterally; no rales, rhonchi or wheezing; respirations unlabored    Heart[de-identified]   Regular rate and rhythm; no murmur, rub, or gallop  Abdomen:   Soft, non-tender, non-distended; normal bowel sounds; no masses, no organomegaly    Genitalia:   Deferred    Rectal:   Deferred    Extremities:  No cyanosis, clubbing or edema    Pulses:  2+ and symmetric    Skin:  No jaundice, rashes, or lesions    Lymph nodes:  No palpable cervical lymphadenopathy        Lab Results:   No visits with results within 1 Day(s) from this visit     Latest known visit with results is:   Orders Only on 06/15/2021   Component Date Value    Glucose, Random 06/15/2021 81     BUN 06/15/2021 11     Creatinine 06/15/2021 1 01     eGFR Non  06/15/2021 80     eGFR  06/15/2021 93     SL AMB BUN/CREATININE RA* 06/15/2021 11     Sodium 06/15/2021 141     Potassium 06/15/2021 4 5     Chloride 06/15/2021 103     CO2 06/15/2021 24     CALCIUM 06/15/2021 9 4     Protein, Total 06/15/2021 6 6     Albumin 06/15/2021 4 2     Globulin, Total 06/15/2021 2 4     Albumin/Globulin Ratio 06/15/2021 1 8     TOTAL BILIRUBIN 06/15/2021 0 5     Alk Phos Isoenzymes 06/15/2021 99     AST 06/15/2021 21     ALT 06/15/2021 26     Cholesterol, Total 06/15/2021 174     Triglycerides 06/15/2021 91     HDL 06/15/2021 45     LDL Calculated 06/15/2021 112*    Hemoglobin A1C 06/15/2021 5 5     Estimated Average Glucose 06/15/2021 111     HIV Screen 4th Generatio* 06/15/2021 Non Reactive     HEP C AB 06/15/2021 <0 1          Radiology Results:   No results found

## 2022-06-20 ENCOUNTER — TELEPHONE (OUTPATIENT)
Dept: GASTROENTEROLOGY | Facility: CLINIC | Age: 62
End: 2022-06-20

## 2022-06-20 NOTE — TELEPHONE ENCOUNTER
I lmom confirming EGD scheduled on 6/27/22 with Dr Ulises Bryant at [facility]  I informed pt that [facility] would be calling him 1-2 days prior with the arrival time  I informed pt that he will need to be npo after midnight and will need a  the day of the procedure  I asked pt to please call back if he has not received his instructions or if he has any questions

## 2022-07-28 ENCOUNTER — OFFICE VISIT (OUTPATIENT)
Dept: FAMILY MEDICINE CLINIC | Facility: OTHER | Age: 62
End: 2022-07-28
Payer: COMMERCIAL

## 2022-07-28 VITALS
SYSTOLIC BLOOD PRESSURE: 108 MMHG | RESPIRATION RATE: 18 BRPM | HEIGHT: 69 IN | HEART RATE: 73 BPM | DIASTOLIC BLOOD PRESSURE: 72 MMHG | BODY MASS INDEX: 42.15 KG/M2 | OXYGEN SATURATION: 98 % | TEMPERATURE: 98 F | WEIGHT: 284.6 LBS

## 2022-07-28 DIAGNOSIS — M25.561 RIGHT MEDIAL KNEE PAIN: Primary | ICD-10-CM

## 2022-07-28 PROCEDURE — 3725F SCREEN DEPRESSION PERFORMED: CPT | Performed by: FAMILY MEDICINE

## 2022-07-28 PROCEDURE — 99213 OFFICE O/P EST LOW 20 MIN: CPT | Performed by: FAMILY MEDICINE

## 2022-07-28 NOTE — PROGRESS NOTES
Assessment/Plan:     Diagnoses and all orders for this visit:    Right medial knee pain  -     XR knee 3 vw right non injury; Future      Isolated right medial knee pain without erythema or swelling which is likely secondary to osteoarthritis  Will check x-ray  Recommend patient using ibuprofen as needed for pain which is not controlled with Biofreeze  Follow-up in 1 month  Will recommend physical therapy if pain is not improved  Subjective:      Patient ID: Cierra Kilpatrick is a 64 y o  male  Patient presents for evaluation of right knee pain which began about 2 weeks ago  He reports 3/10 severity which is worse in the morning or when getting up after sitting for prolonged periods  He notes that the pain is improved from what it was initially but has not totally resolved  He describes the pain as stiffness and dullness  He says it is better with movement throughout the day  He has been using Biofreeze as well as stretching which help temporarily, but he denies use of any oral medications  The patient denies any recent or past injuries to the knee as well as any fevers, chills, redness, swelling, numbness, tingling while locking of the knee, and any other joint pains  He notes that he works as a  and is on his feet a lot  He used to be a power   Patient has a family history of osteoarthritis as well as rheumatoid arthritis  The following portions of the patient's history were reviewed and updated as appropriate: past medical history, past surgical history and problem list     Review of Systems   Constitutional: Negative for activity change, appetite change, chills, fever and unexpected weight change  Eyes: Negative for visual disturbance  Respiratory: Negative for cough, shortness of breath and wheezing  Cardiovascular: Negative for chest pain and leg swelling     Gastrointestinal: Negative for abdominal pain, blood in stool, constipation, diarrhea, nausea and vomiting  Musculoskeletal: Positive for arthralgias (Right knee)  Negative for myalgias  Skin: Negative for rash  Neurological: Negative for dizziness, weakness, light-headedness, numbness and headaches  All other systems reviewed and are negative  Objective:       /72   Pulse 73   Temp 98 °F (36 7 °C)   Resp 18   Ht 5' 9" (1 753 m)   Wt 129 kg (284 lb 9 6 oz)   SpO2 98%   BMI 42 03 kg/m²          Physical Exam  Vitals reviewed  Constitutional:       General: He is not in acute distress  Appearance: He is well-developed  He is obese  He is not diaphoretic  HENT:      Head: Normocephalic and atraumatic  Right Ear: External ear normal       Left Ear: External ear normal       Nose: Nose normal       Mouth/Throat:      Mouth: Mucous membranes are moist       Pharynx: Oropharynx is clear  Eyes:      Extraocular Movements: Extraocular movements intact  Conjunctiva/sclera: Conjunctivae normal       Pupils: Pupils are equal, round, and reactive to light  Cardiovascular:      Rate and Rhythm: Normal rate and regular rhythm  Pulses: Normal pulses  Heart sounds: Normal heart sounds  No murmur heard  No friction rub  No gallop  Pulmonary:      Effort: Pulmonary effort is normal  No respiratory distress  Breath sounds: Normal breath sounds  No stridor  No wheezing, rhonchi or rales  Abdominal:      General: Abdomen is flat  Bowel sounds are normal  There is no distension  Palpations: Abdomen is soft  There is no mass  Tenderness: There is no abdominal tenderness  There is no right CVA tenderness, left CVA tenderness or guarding  Musculoskeletal:         General: Normal range of motion  Cervical back: Normal range of motion and neck supple  Right knee: No swelling, deformity, erythema or crepitus  Normal range of motion  Tenderness present over the medial joint line  No lateral joint line or patellar tendon tenderness   No LCL laxity, MCL laxity, ACL laxity or PCL laxity  Normal alignment, normal meniscus and normal patellar mobility  Instability Tests: Anterior drawer test negative  Posterior drawer test negative  Anterior Lachman test negative  Medial Boyd test negative and lateral Boyd test negative  Left knee: Normal       Right lower leg: Edema (1+) present  Left lower leg: Edema (1+) present  Comments: Pain with varus stress  Lymphadenopathy:      Cervical: No cervical adenopathy  Skin:     General: Skin is warm and dry  Findings: Rash (Well-circumscribed area of erythema with mild scaling on forehead which patient states is due to sunburn) present  Neurological:      General: No focal deficit present  Mental Status: He is alert and oriented to person, place, and time     Psychiatric:         Mood and Affect: Mood normal          Behavior: Behavior normal

## 2022-07-28 NOTE — PATIENT INSTRUCTIONS
Knee Pain   AMBULATORY CARE:   Knee pain  may start suddenly, or it may be a long-term problem  You may have pain on the side, front, or back of your knee  You may have knee stiffness and swelling  You may hear popping sounds or feel like your knee is giving way or locking up as you walk  You may feel pain when you sit, stand, walk, or climb up and down stairs  Knee pain can be caused by conditions such as obesity, inflammation, or strains or tears in ligaments or tendons  Seek care immediately if:   Your pain is worse, even after treatment  You cannot bend or straighten your leg completely  The swelling around your knee does not go down even with treatment  Your knee is painful and hot to the touch  Contact your healthcare provider if:   You have questions or concerns about your condition or care  Treatment  will depend on the cause of your pain  You may need any of the following:  NSAIDs  help decrease swelling and pain or fever  This medicine is available with or without a doctor's order  NSAIDs can cause stomach bleeding or kidney problems in certain people  If you take blood thinner medicine, always ask your healthcare provider if NSAIDs are safe for you  Always read the medicine label and follow directions  Acetaminophen  decreases pain and fever  It is available without a doctor's order  Ask how much to take and how often to take it  Follow directions  Read the labels of all other medicines you are using to see if they also contain acetaminophen, or ask your doctor or pharmacist  Acetaminophen can cause liver damage if not taken correctly  Do not use more than 4 grams (4,000 milligrams) total of acetaminophen in one day  Prescription pain medicine  may be given  Ask your healthcare provider how to take this medicine safely  Some prescription pain medicines contain acetaminophen  Do not take other medicines that contain acetaminophen without talking to your healthcare provider   Too much acetaminophen may cause liver damage  Prescription pain medicine may cause constipation  Ask your healthcare provider how to prevent or treat constipation  Steroid injections  may be given into your knee  Steroids reduce inflammation and pain  Surgery  may be used for some injuries, such as to repair a torn ACL  What you can do to manage your symptoms:   Rest your knee so it can heal   Limit activities that increase your pain  Do low-impact exercises, such as walking or swimming  Apply ice to help reduce swelling and pain  Use an ice pack, or put crushed ice in a plastic bag  Cover it with a towel before you apply it to your knee  Apply ice for 15 to 20 minutes every hour, or as directed  Apply compression to help reduce swelling  Use a brace or bandage only as directed  Elevate your knee to help decrease pain and swelling  Elevate your knee while you are sitting or lying down  Prop your leg on pillows to keep your knee above the level of your heart  Prevent your knee from moving as directed  Your healthcare provider may put on a cast or splint  You may need to wear a leg brace to stabilize your knee  A leg brace can be adjusted to increase your range of motion as your knee heals  What you can do to prevent knee pain:   Maintain a healthy weight  Extra weight increases your risk for knee pain  Ask your healthcare provider how much you should weigh  He or she can help you create a safe weight loss plan if you need to lose weight  Exercise or train properly  Use the correct equipment for sports  Wear shoes that provide good support  Check your posture often as you exercise, play sports, or train for an event  This can help prevent stress and strain on your knees  Rest between sessions so you do not overwork your knees  Follow up with your healthcare provider within 24 hours or as directed:  Write down your questions so you remember to ask them during your visits     © Copyright [employer] 2022 Information is for End User's use only and may not be sold, redistributed or otherwise used for commercial purposes  All illustrations and images included in CareNotes® are the copyrighted property of [employer]  or [employer]  The above information is an  only  It is not intended as medical advice for individual conditions or treatments  Talk to your doctor, nurse or pharmacist before following any medical regimen to see if it is safe and effective for you

## 2022-08-01 ENCOUNTER — HOSPITAL ENCOUNTER (OUTPATIENT)
Dept: RADIOLOGY | Facility: HOSPITAL | Age: 62
Discharge: HOME | End: 2022-08-01
Payer: COMMERCIAL

## 2022-08-01 DIAGNOSIS — M25.561 RIGHT MEDIAL KNEE PAIN: ICD-10-CM

## 2022-08-01 PROCEDURE — 73562 X-RAY EXAM OF KNEE 3: CPT

## 2022-08-04 NOTE — RESULT ENCOUNTER NOTE
X-ray shows no acute abnormality but does show some mild joint space narrowing which could indicate osteoarthritis

## 2022-08-12 ENCOUNTER — TELEPHONE (OUTPATIENT)
Dept: FAMILY MEDICINE CLINIC | Facility: OTHER | Age: 62
End: 2022-08-12

## 2022-08-12 DIAGNOSIS — M25.561 RIGHT MEDIAL KNEE PAIN: Primary | ICD-10-CM

## 2022-08-12 RX ORDER — NAPROXEN 500 MG/1
500 TABLET ORAL 2 TIMES DAILY WITH MEALS
Qty: 28 TABLET | Refills: 0 | Status: SHIPPED | OUTPATIENT
Start: 2022-08-12 | End: 2022-08-24 | Stop reason: SDUPTHER

## 2022-08-12 NOTE — TELEPHONE ENCOUNTER
Patient is going on vacation tomorrow and his knee is really hurting he has tried OTC meds nothing is helping and he is concerned with being on vacation    He would like something sent into pharmacy for him     Please advise   Its ok to leave message on his home phone     Thank you

## 2022-08-24 DIAGNOSIS — M25.561 RIGHT MEDIAL KNEE PAIN: ICD-10-CM

## 2022-08-26 RX ORDER — NAPROXEN 500 MG/1
500 TABLET ORAL 2 TIMES DAILY WITH MEALS
Qty: 28 TABLET | Refills: 0 | Status: SHIPPED | OUTPATIENT
Start: 2022-08-26 | End: 2022-09-19

## 2022-09-19 ENCOUNTER — OFFICE VISIT (OUTPATIENT)
Dept: FAMILY MEDICINE CLINIC | Facility: OTHER | Age: 62
End: 2022-09-19
Payer: COMMERCIAL

## 2022-09-19 VITALS
RESPIRATION RATE: 18 BRPM | TEMPERATURE: 98.1 F | BODY MASS INDEX: 42.06 KG/M2 | HEIGHT: 69 IN | SYSTOLIC BLOOD PRESSURE: 134 MMHG | WEIGHT: 284 LBS | DIASTOLIC BLOOD PRESSURE: 76 MMHG | OXYGEN SATURATION: 96 % | HEART RATE: 66 BPM

## 2022-09-19 DIAGNOSIS — Z13.6 SCREENING FOR CARDIOVASCULAR CONDITION: ICD-10-CM

## 2022-09-19 DIAGNOSIS — M25.561 RIGHT MEDIAL KNEE PAIN: ICD-10-CM

## 2022-09-19 DIAGNOSIS — R21 RASH: ICD-10-CM

## 2022-09-19 DIAGNOSIS — Z00.00 ANNUAL PHYSICAL EXAM: Primary | ICD-10-CM

## 2022-09-19 DIAGNOSIS — Z23 ENCOUNTER FOR IMMUNIZATION: ICD-10-CM

## 2022-09-19 DIAGNOSIS — Z13.1 SCREENING FOR DIABETES MELLITUS: ICD-10-CM

## 2022-09-19 PROCEDURE — 90715 TDAP VACCINE 7 YRS/> IM: CPT

## 2022-09-19 PROCEDURE — 3725F SCREEN DEPRESSION PERFORMED: CPT | Performed by: FAMILY MEDICINE

## 2022-09-19 PROCEDURE — 90471 IMMUNIZATION ADMIN: CPT

## 2022-09-19 PROCEDURE — 99396 PREV VISIT EST AGE 40-64: CPT | Performed by: FAMILY MEDICINE

## 2022-09-19 RX ORDER — KETOCONAZOLE 20 MG/G
CREAM TOPICAL DAILY
Qty: 15 G | Refills: 0 | Status: SHIPPED | OUTPATIENT
Start: 2022-09-19

## 2022-09-19 NOTE — PATIENT INSTRUCTIONS
Wellness Visit for Adults   AMBULATORY CARE:   A wellness visit  is when you see your healthcare provider to get screened for health problems  Your healthcare provider will also give you advice on how to stay healthy  Write down your questions so you remember to ask them  Ask your healthcare provider how often you should have a wellness visit  What happens at a wellness visit:  Your healthcare provider will ask about your health, and your family history of health problems  This includes high blood pressure, heart disease, and cancer  He or she will ask if you have symptoms that concern you, if you smoke, and about your mood  You may also be asked about your intake of medicines, supplements, food, and alcohol  Any of the following may be done:  · Your weight  will be checked  Your height may also be checked so your body mass index (BMI) can be calculated  Your BMI shows if you are at a healthy weight  · Your blood pressure  and heart rate will be checked  Your temperature may also be checked  · Blood and urine tests  may be done  Blood tests may be done to check your cholesterol levels  Abnormal cholesterol levels increase your risk for heart disease and stroke  You may also need a blood or urine test to check for diabetes if you are at increased risk  Urine tests may be done to look for signs of an infection or kidney disease  · A physical exam  includes checking your heartbeat and lungs with a stethoscope  Your healthcare provider may also check your skin to look for sun damage  · Screening tests  may be recommended  A screening test is done to check for diseases that may not cause symptoms  The screening tests you may need depend on your age, gender, family history, and lifestyle habits  For example, colorectal screening may be recommended if you are 48years old or older  Screening tests you need if you are a woman:   · A Pap smear  is used to screen for cervical cancer   Pap smears are usually done every 3 to 5 years depending on your age  You may need them more often if you have had abnormal Pap smear test results in the past  Ask your healthcare provider how often you should have a Pap smear  · A mammogram  is an x-ray of your breasts to screen for breast cancer  Experts recommend mammograms every 2 years starting at age 48 years  You may need a mammogram at age 52 years or younger if you have an increased risk for breast cancer  Talk to your healthcare provider about when you should start having mammograms and how often you need them  Vaccines you may need:   · Get an influenza vaccine  every year  The influenza vaccine protects you from the flu  Several types of viruses cause the flu  The viruses change over time, so new vaccines are made each year  · Get a tetanus-diphtheria (Td) booster vaccine  every 10 years  This vaccine protects you against tetanus and diphtheria  Tetanus is a severe infection that may cause painful muscle spasms and lockjaw  Diphtheria is a severe bacterial infection that causes a thick covering in the back of your mouth and throat  · Get a human papillomavirus (HPV) vaccine  if you are female and aged 23 to 32 or male 23 to 24 and never received it  This vaccine protects you from HPV infection  HPV is the most common infection spread by sexual contact  HPV may also cause vaginal, penile, and anal cancers  · Get a pneumococcal vaccine  if you are aged 72 years or older  The pneumococcal vaccine is an injection given to protect you from pneumococcal disease  Pneumococcal disease is an infection caused by pneumococcal bacteria  The infection may cause pneumonia, meningitis, or an ear infection  · Get a shingles vaccine  if you are 60 or older, even if you have had shingles before  The shingles vaccine is an injection to protect you from the varicella-zoster virus  This is the same virus that causes chickenpox   Shingles is a painful rash that develops in people who had chickenpox or have been exposed to the virus  How to eat healthy:  My Plate is a model for planning healthy meals  It shows the types and amounts of foods that should go on your plate  Fruits and vegetables make up about half of your plate, and grains and protein make up the other half  A serving of dairy is included on the side of your plate  The amount of calories and serving sizes you need depends on your age, gender, weight, and height  Examples of healthy foods are listed below:  · Eat a variety of vegetables  such as dark green, red, and orange vegetables  You can also include canned vegetables low in sodium (salt) and frozen vegetables without added butter or sauces  · Eat a variety of fresh fruits , canned fruit in 100% juice, frozen fruit, and dried fruit  · Include whole grains  At least half of the grains you eat should be whole grains  Examples include whole-wheat bread, wheat pasta, brown rice, and whole-grain cereals such as oatmeal     · Eat a variety of protein foods such as seafood (fish and shellfish), lean meat, and poultry without skin (turkey and chicken)  Examples of lean meats include pork leg, shoulder, or tenderloin, and beef round, sirloin, tenderloin, and extra lean ground beef  Other protein foods include eggs and egg substitutes, beans, peas, soy products, nuts, and seeds  · Choose low-fat dairy products such as skim or 1% milk or low-fat yogurt, cheese, and cottage cheese  · Limit unhealthy fats  such as butter, hard margarine, and shortening  Exercise:  Exercise at least 30 minutes per day on most days of the week  Some examples of exercise include walking, biking, dancing, and swimming  You can also fit in more physical activity by taking the stairs instead of the elevator or parking farther away from stores  Include muscle strengthening activities 2 days each week  Regular exercise provides many health benefits   It helps you manage your weight, and decreases your risk for type 2 diabetes, heart disease, stroke, and high blood pressure  Exercise can also help improve your mood  Ask your healthcare provider about the best exercise plan for you  General health and safety guidelines:   · Do not smoke  Nicotine and other chemicals in cigarettes and cigars can cause lung damage  Ask your healthcare provider for information if you currently smoke and need help to quit  E-cigarettes or smokeless tobacco still contain nicotine  Talk to your healthcare provider before you use these products  · Limit alcohol  A drink of alcohol is 12 ounces of beer, 5 ounces of wine, or 1½ ounces of liquor  · Lose weight, if needed  Being overweight increases your risk of certain health conditions  These include heart disease, high blood pressure, type 2 diabetes, and certain types of cancer  · Protect your skin  Do not sunbathe or use tanning beds  Use sunscreen with a SPF 15 or higher  Apply sunscreen at least 15 minutes before you go outside  Reapply sunscreen every 2 hours  Wear protective clothing, hats, and sunglasses when you are outside  · Drive safely  Always wear your seatbelt  Make sure everyone in your car wears a seatbelt  A seatbelt can save your life if you are in an accident  Do not use your cell phone when you are driving  This could distract you and cause an accident  Pull over if you need to make a call or send a text message  · Practice safe sex  Use latex condoms if are sexually active and have more than one partner  Your healthcare provider may recommend screening tests for sexually transmitted infections (STIs)  · Wear helmets, lifejackets, and protective gear  Always wear a helmet when you ride a bike or motorcycle, go skiing, or play sports that could cause a head injury  Wear protective equipment when you play sports  Wear a lifejacket when you are on a boat or doing water sports      © Copyright [employer] 2022 Information is for End User's use only and may not be sold, redistributed or otherwise used for commercial purposes  All illustrations and images included in CareNotes® are the copyrighted property of [employer]  or [employer]  The above information is an  only  It is not intended as medical advice for individual conditions or treatments  Talk to your doctor, nurse or pharmacist before following any medical regimen to see if it is safe and effective for you  Obesity   AMBULATORY CARE:   Obesity  means your body mass index (BMI) is greater than 30  Your healthcare provider will use your height and weight to measure your BMI  The risks of obesity include  many health problems, including injuries or physical disability  · Diabetes (high blood sugar level)    · High blood pressure or high cholesterol    · Heart disease    · Stroke    · Gallbladder or liver disease    · Cancer of the colon, breast, prostate, liver, or kidney    · Sleep apnea    · Arthritis or gout    Screening  is done to check for health conditions before you have signs or symptoms  If you are 28to 79years old, your blood sugar level may be checked every 3 years for signs of prediabetes or diabetes  Your healthcare provider will check your blood pressure at each visit  High blood pressure can lead to a stroke or other problems  Your provider may check for signs of heart disease, cancer, or other health problems  Seek care immediately if:   · You have a severe headache, confusion, or difficulty speaking  · You have weakness on one side of your body  · You have chest pain, sweating, or shortness of breath  Call your doctor if:   · You have symptoms of gallbladder or liver disease, such as pain in your upper abdomen  · You have knee or hip pain and discomfort while walking  · You have symptoms of diabetes, such as intense hunger and thirst, and frequent urination      · You have symptoms of sleep apnea, such as snoring or daytime sleepiness  · You have questions or concerns about your condition or care  Treatment for obesity  focuses on helping you lose weight to improve your health  Even a small decrease in BMI can reduce the risk for many health problems  Your healthcare provider will help you set a weight-loss goal   · Lifestyle changes  are the first step in treating obesity  These include making healthy food choices and getting regular physical activity  Your healthcare provider may suggest a weight-loss program that involves coaching, education, and therapy  · Medicine  may help you lose weight when it is used with a healthy foods and physical activity  · Surgery  can help you lose weight if you are very obese and have other health problems  There are several types of weight-loss surgery  Ask your healthcare provider for more information  Tips for safe weight loss:   · Set small, realistic goals  An example of a small goal is to walk for 20 minutes 5 days a week  Anther goal is to lose 5% of your body weight  · Tell friends, family members, and coworkers about your goals  and ask for their support  Ask a friend to lose weight with you, or join a weight-loss support group  · Identify foods or triggers that may cause you to overeat , and find ways to avoid them  Remove tempting high-calorie foods from your home and workplace  Place a bowl of fresh fruit on your kitchen counter  If stress causes you to eat, then find other ways to cope with stress  A counselor or therapist may be able to help you  · Keep a diary to track what you eat and drink  Also write down how many minutes of physical activity you do each day  Weigh yourself once a week and record it in your diary  Eating changes: You will need to eat 500 to 1,000 fewer calories each day than you currently eat to lose 1 to 2 pounds a week  The following changes will help you cut calories:  · Eat smaller portions    Use small plates, no larger than 9 inches in diameter  Fill your plate half full of fruits and vegetables  Measure your food using measuring cups until you know what a serving size looks like  · Eat 3 meals and 1 or 2 snacks each day  Plan your meals in advance  Silvano Shah and eat at home most of the time  Eat slowly  Do not skip meals  Skipping meals can lead to overeating later in the day  This can make it harder for you to lose weight  Talk with a dietitian to help you make a meal plan and schedule that is right for you  · Eat fruits and vegetables at every meal   They are low in calories and high in fiber, which makes you feel full  Do not add butter, margarine, or cream sauce to vegetables  Use herbs to season steamed vegetables  · Eat less fat and fewer fried foods  Eat more baked or grilled chicken and fish  These protein sources are lower in calories and fat than red meat  Limit fast food  Dress your salads with olive oil and vinegar instead of bottled dressing  · Limit the amount of sugar you eat  Do not drink sugary beverages  Limit alcohol  Activity changes:  Physical activity is good for your body in many ways  It helps you burn calories and build strong muscles  It decreases stress and depression, and improves your mood  It can also help you sleep better  Talk to your healthcare provider before you begin an exercise program   · Exercise for at least 30 minutes 5 days a week  Start slowly  Set aside time each day for physical activity that you enjoy and that is convenient for you  It is best to do both weight training and an activity that increases your heart rate, such as walking, bicycling, or swimming  · Find ways to be more active  Do yard work and housecleaning  Walk up the stairs instead of using elevators  Spend your leisure time going to events that require walking, such as outdoor festivals or fairs  This extra physical activity can help you lose weight and keep it off         Follow up with your doctor as directed: You may need to meet with a dietitian  Write down your questions so you remember to ask them during your visits  © Copyright [email or web address] 2022 Information is for End User's use only and may not be sold, redistributed or otherwise used for commercial purposes  All illustrations and images included in CareNotes® are the copyrighted property of [employer]  or [facility] [place] [facility]   The above information is an  only  It is not intended as medical advice for individual conditions or treatments  Talk to your doctor, nurse or pharmacist before following any medical regimen to see if it is safe and effective for you

## 2022-09-19 NOTE — PROGRESS NOTES
ADULT ANNUAL [facility]    NAME: Lynn Yanes  AGE: 58 y o  SEX: male  : 1960     DATE: 2022     Assessment and Plan:     Problem List Items Addressed This Visit    None     Visit Diagnoses     Annual physical exam    -  Primary    Right medial knee pain        Relevant Medications    Diclofenac Sodium (VOLTAREN) 1 %    Rash        Relevant Medications    Diclofenac Sodium (VOLTAREN) 1 %    ketoconazole (NIZORAL) 2 % cream    Screening for diabetes mellitus        Relevant Orders    Comprehensive metabolic panel    Screening for cardiovascular condition        Relevant Orders    Lipid Panel with Direct LDL reflex    Encounter for immunization        Relevant Orders    Tdap Vaccine greater than or equal to 8yo (Completed)        Right medial knee pain most consistent with pes anserinus pain syndrome  Patient declines physical therapy  Will prescribe Voltaren gel for now  Scaling of eyebrows likely due to seborrheic dermatitis  Recommended patient use anti dandruff shampoo on the face as well as the scalp  Rash on the chest appears to be tinea versicolor  Prescribed topical ketoconazole cream   Also recommended patient follow up with optometrist/ophthalmologist for new glasses prescription  Immunizations and preventive care screenings were discussed with patient today  Appropriate education was printed on patient's after visit summary  Discussed risks and benefits of prostate cancer screening  We discussed the controversial history of PSA screening for prostate cancer in the [place] as well as the risk of over detection and over treatment of prostate cancer by way of PSA screening    The patient understands that PSA blood testing is an imperfect way to screen for prostate cancer and that elevated PSA levels in the blood may also be caused by infection, inflammation, prostatic trauma or manipulation, urological procedures, or by benign prostatic enlargement  Counseling:  Alcohol/drug use: discussed moderation in alcohol intake, the recommendations for healthy alcohol use, and avoidance of illicit drug use  Dental Health: discussed importance of regular tooth brushing, flossing, and dental visits  Injury prevention: discussed safety/seat belts, safety helmets, smoke detectors, carbon dioxide detectors, and smoking near bedding or upholstery  Sexual health: discussed sexually transmitted diseases, partner selection, use of condoms, avoidance of unintended pregnancy, and contraceptive alternatives  Exercise: the importance of regular exercise/physical activity was discussed  Recommend exercise 3-5 times per week for at least 30 minutes  BMI Counseling: Body mass index is 41 94 kg/m²  The BMI is above normal  Nutrition recommendations include encouraging healthy choices of fruits and vegetables  Exercise recommendations include moderate physical activity 150 minutes/week  No pharmacotherapy was ordered  Rationale for BMI follow-up plan is due to patient being overweight or obese  Depression Screening and Follow-up Plan: Patient was screened for depression during today's encounter  They screened negative with a PHQ-2 score of 0            Emotional and Mental Well-being, Sleep, Connectedness Assessment and Intervention:    Sleep/stress assessment performed    Depression and anxiety screening performed and reviewed      Tobacco and Toxic Substance Assessment and Intervention:     Tobacco use screening performed    Alcohol and drug use screening performed      Therapeutic Lifestyle Change Visit:     One-on-one comprehensive counseling, coaching, and health behavior change visit completed        Return in about 1 year (around 9/19/2023) for Annual physical      Chief Complaint:     Chief Complaint   Patient presents with    Physical Exam    Knee Pain     Follow up right      History of Present Illness:     Adult Annual Physical   Patient here for a comprehensive physical exam  The patient reports problems - right medial knee pain  R medial knee pain improved from previously following naproxen use, however patient has been using his wife's Voltaren gel daily with adequate improvement  Diet and Physical Activity   Diet/Nutrition: limited fruits/vegetables  Exercise: 5-7 times a week on average, less than 30 minutes on average and streching, squats, and stair climbing  Depression Screening  PHQ-2/9 Depression Screening    Little interest or pleasure in doing things: 0 - not at all  Feeling down, depressed, or hopeless: 0 - not at all  PHQ-2 Score: 0  PHQ-2 Interpretation: Negative depression screen       General Health  Sleep: sleeps well, gets 7-8 hours of sleep on average and using CPAP  Hearing: normal - bilateral   Vision: vision problems: gradually more blurry, wears separate driving/TV/reading glasses, most recent eye exam >1 year ago and wears glasses  Dental: regular dental visits and brushes teeth twice daily   Health  Symptoms include: none     Review of Systems:     Review of Systems   Constitutional: Negative for chills, fever and unexpected weight change  HENT: Negative for congestion, rhinorrhea and sore throat  Eyes: Negative for visual disturbance  Respiratory: Negative for cough, shortness of breath and wheezing  Cardiovascular: Negative for chest pain, palpitations and leg swelling  Gastrointestinal: Negative for abdominal pain, blood in stool, constipation, diarrhea, nausea and vomiting  Stool leakage, not new   Genitourinary: Negative for difficulty urinating, dysuria and hematuria  Musculoskeletal: Positive for arthralgias (R knee)  Negative for myalgias  Skin: Positive for rash (lower legs and chest, face/eyebrows)  Neurological: Negative for dizziness, light-headedness and headaches  Psychiatric/Behavioral: Negative for dysphoric mood     All other systems reviewed and are negative       Past Medical History:     Past Medical History:   Diagnosis Date    Colon polyp     CPAP (continuous positive airway pressure) dependence     MARY (obstructive sleep apnea)     Osteoarthritis       Past Surgical History:     Past Surgical History:   Procedure Laterality Date    COLONOSCOPY      SINUS SURGERY Bilateral 2014      Family History:     Family History   Problem Relation Age of Onset    No Known Problems Mother     Thyroid disease Family       Social History:     Social History     Socioeconomic History    Marital status: /Civil Union     Spouse name: None    Number of children: None    Years of education: None    Highest education level: None   Occupational History    None   Tobacco Use    Smoking status: Never Smoker    Smokeless tobacco: Never Used   Vaping Use    Vaping Use: Never used   Substance and Sexual Activity    Alcohol use: No    Drug use: No    Sexual activity: None   Other Topics Concern    None   Social History Narrative    None     Social Determinants of Health     Financial Resource Strain: Not on file   Food Insecurity: Not on file   Transportation Needs: Not on file   Physical Activity: Not on file   Stress: Not on file   Social Connections: Not on file   Intimate Partner Violence: Not on file   Housing Stability: Not on file      Current Medications:     Current Outpatient Medications   Medication Sig Dispense Refill    azelastine (ASTELIN) 0 1 % nasal spray 1 spray into each nostril 2 (two) times a day Use in each nostril as directed      Diclofenac Sodium (VOLTAREN) 1 % Apply 2 g topically 4 (four) times a day 50 g 0    ketoconazole (NIZORAL) 2 % cream Apply topically daily 15 g 0    pantoprazole (PROTONIX) 40 mg tablet Take 1 tablet (40 mg total) by mouth daily Take daily in a m  1/2  prior to breakfast (Patient taking differently: Take 40 mg by mouth daily Take daily in a m  1/2  prior to breakfast) 30 tablet 5    Respiratory Therapy Supplies (CareTouch CPAP & BIPAP Hose) MISC Use      fluticasone (FLONASE) 50 mcg/act nasal spray  (Patient not taking: No sig reported)       No current facility-administered medications for this visit  Allergies:     No Known Allergies   Physical Exam:     /76 (BP Location: Left arm)   Pulse 66   Temp 98 1 °F (36 7 °C)   Resp 18   Ht 5' 9" (1 753 m)   Wt 129 kg (284 lb)   SpO2 96%   BMI 41 94 kg/m²     Physical Exam  Vitals reviewed  Constitutional:       General: He is not in acute distress  Appearance: He is obese  He is not diaphoretic  HENT:      Head: Normocephalic and atraumatic  Right Ear: External ear normal       Left Ear: External ear normal       Nose: Nose normal       Mouth/Throat:      Mouth: Mucous membranes are moist       Pharynx: Oropharynx is clear  Eyes:      Extraocular Movements: Extraocular movements intact  Conjunctiva/sclera: Conjunctivae normal       Pupils: Pupils are equal, round, and reactive to light  Cardiovascular:      Rate and Rhythm: Normal rate and regular rhythm  Pulses: Normal pulses  Heart sounds: Normal heart sounds  No murmur heard  No friction rub  No gallop  Pulmonary:      Effort: Pulmonary effort is normal  No respiratory distress  Breath sounds: Normal breath sounds  No stridor  No wheezing, rhonchi or rales  Abdominal:      General: Bowel sounds are normal  There is no distension  Palpations: Abdomen is soft  Tenderness: There is no abdominal tenderness  There is no right CVA tenderness, left CVA tenderness or guarding  Musculoskeletal:         General: Tenderness (R medial knee inferior to joint line) present  Normal range of motion  Cervical back: Normal range of motion and neck supple  Right lower leg: No edema  Left lower leg: No edema  Lymphadenopathy:      Cervical: No cervical adenopathy  Skin:     General: Skin is warm and dry        Findings: Rash (scaling in eyebrows, erythematous papules on central chest) present  Neurological:      General: No focal deficit present  Mental Status: He is alert and oriented to person, place, and time     Psychiatric:         Mood and Affect: Mood normal          Behavior: Behavior normal          Jeannette Severino MD  [place] [id number]

## 2022-10-08 DIAGNOSIS — K21.9 GASTROESOPHAGEAL REFLUX DISEASE WITHOUT ESOPHAGITIS: ICD-10-CM

## 2022-10-08 RX ORDER — PANTOPRAZOLE SODIUM 40 MG/1
TABLET, DELAYED RELEASE ORAL
Qty: 90 TABLET | Refills: 1 | Status: SHIPPED | OUTPATIENT
Start: 2022-10-08

## 2022-10-11 PROBLEM — Z12.11 SCREENING FOR COLON CANCER: Status: RESOLVED | Noted: 2022-04-21 | Resolved: 2022-10-11

## 2022-12-28 DIAGNOSIS — R21 RASH: ICD-10-CM

## 2022-12-28 RX ORDER — KETOCONAZOLE 20 MG/G
CREAM TOPICAL
Qty: 15 G | Refills: 0 | Status: SHIPPED | OUTPATIENT
Start: 2022-12-28

## 2023-01-02 ENCOUNTER — HOSPITAL ENCOUNTER (EMERGENCY)
Facility: HOSPITAL | Age: 63
Discharge: HOME | End: 2023-01-03
Attending: EMERGENCY MEDICINE

## 2023-01-02 ENCOUNTER — APPOINTMENT (EMERGENCY)
Dept: RADIOLOGY | Facility: HOSPITAL | Age: 63
End: 2023-01-02

## 2023-01-02 DIAGNOSIS — J32.9 SINUSITIS: Primary | ICD-10-CM

## 2023-01-02 LAB
ALBUMIN SERPL BCP-MCNC: 3.7 G/DL (ref 3.5–5)
ALP SERPL-CCNC: 78 U/L (ref 34–104)
ALT SERPL W P-5'-P-CCNC: 22 U/L (ref 7–52)
ANION GAP SERPL CALCULATED.3IONS-SCNC: 5 MMOL/L (ref 4–13)
AST SERPL W P-5'-P-CCNC: 16 U/L (ref 13–39)
BASOPHILS # BLD AUTO: 0.11 THOUSANDS/ÂΜL (ref 0–0.1)
BASOPHILS NFR BLD AUTO: 1 % (ref 0–1)
BILIRUB SERPL-MCNC: 0.44 MG/DL (ref 0.2–1)
BUN SERPL-MCNC: 12 MG/DL (ref 5–25)
CALCIUM SERPL-MCNC: 9.1 MG/DL (ref 8.4–10.2)
CARDIAC TROPONIN I PNL SERPL HS: 5 NG/L
CHLORIDE SERPL-SCNC: 110 MMOL/L (ref 96–108)
CO2 SERPL-SCNC: 24 MMOL/L (ref 21–32)
CREAT SERPL-MCNC: 0.91 MG/DL (ref 0.6–1.3)
EOSINOPHIL # BLD AUTO: 0.56 THOUSAND/ÂΜL (ref 0–0.61)
EOSINOPHIL NFR BLD AUTO: 5 % (ref 0–6)
ERYTHROCYTE [DISTWIDTH] IN BLOOD BY AUTOMATED COUNT: 13.1 % (ref 11.6–15.1)
GFR SERPL CREATININE-BSD FRML MDRD: 90 ML/MIN/1.73SQ M
GLUCOSE SERPL-MCNC: 102 MG/DL (ref 65–140)
HCT VFR BLD AUTO: 46.1 % (ref 36.5–49.3)
HGB BLD-MCNC: 15.2 G/DL (ref 12–17)
IMM GRANULOCYTES # BLD AUTO: 0.05 THOUSAND/UL (ref 0–0.2)
IMM GRANULOCYTES NFR BLD AUTO: 0 % (ref 0–2)
LIPASE SERPL-CCNC: 28 U/L (ref 11–82)
LYMPHOCYTES # BLD AUTO: 2.36 THOUSANDS/ÂΜL (ref 0.6–4.47)
LYMPHOCYTES NFR BLD AUTO: 20 % (ref 14–44)
MCH RBC QN AUTO: 28.5 PG (ref 26.8–34.3)
MCHC RBC AUTO-ENTMCNC: 33 G/DL (ref 31.4–37.4)
MCV RBC AUTO: 86 FL (ref 82–98)
MONOCYTES # BLD AUTO: 0.98 THOUSAND/ÂΜL (ref 0.17–1.22)
MONOCYTES NFR BLD AUTO: 8 % (ref 4–12)
NEUTROPHILS # BLD AUTO: 7.64 THOUSANDS/ÂΜL (ref 1.85–7.62)
NEUTS SEG NFR BLD AUTO: 66 % (ref 43–75)
NRBC BLD AUTO-RTO: 0 /100 WBCS
PLATELET # BLD AUTO: 344 THOUSANDS/UL (ref 149–390)
PMV BLD AUTO: 9.4 FL (ref 8.9–12.7)
POTASSIUM SERPL-SCNC: 3.7 MMOL/L (ref 3.5–5.3)
PROT SERPL-MCNC: 6.8 G/DL (ref 6.4–8.4)
RBC # BLD AUTO: 5.34 MILLION/UL (ref 3.88–5.62)
SODIUM SERPL-SCNC: 139 MMOL/L (ref 135–147)
WBC # BLD AUTO: 11.7 THOUSAND/UL (ref 4.31–10.16)

## 2023-01-03 ENCOUNTER — APPOINTMENT (EMERGENCY)
Dept: CT IMAGING | Facility: HOSPITAL | Age: 63
End: 2023-01-03

## 2023-01-03 VITALS
DIASTOLIC BLOOD PRESSURE: 92 MMHG | HEART RATE: 63 BPM | BODY MASS INDEX: 40.99 KG/M2 | RESPIRATION RATE: 20 BRPM | OXYGEN SATURATION: 98 % | TEMPERATURE: 98.2 F | SYSTOLIC BLOOD PRESSURE: 128 MMHG | WEIGHT: 277.56 LBS

## 2023-01-03 LAB
2HR DELTA HS TROPONIN: 1 NG/L
ATRIAL RATE: 70 BPM
BNP SERPL-MCNC: 69 PG/ML (ref 0–100)
CARDIAC TROPONIN I PNL SERPL HS: 6 NG/L
FLUAV RNA RESP QL NAA+PROBE: NEGATIVE
FLUBV RNA RESP QL NAA+PROBE: NEGATIVE
P AXIS: 64 DEGREES
PR INTERVAL: 174 MS
QRS AXIS: 37 DEGREES
QRSD INTERVAL: 72 MS
QT INTERVAL: 402 MS
QTC INTERVAL: 434 MS
RSV RNA RESP QL NAA+PROBE: NEGATIVE
SARS-COV-2 RNA RESP QL NAA+PROBE: NEGATIVE
T WAVE AXIS: -12 DEGREES
VENTRICULAR RATE: 70 BPM

## 2023-01-03 RX ORDER — AMOXICILLIN AND CLAVULANATE POTASSIUM 875; 125 MG/1; MG/1
1 TABLET, FILM COATED ORAL EVERY 12 HOURS
Qty: 14 TABLET | Refills: 0 | Status: SHIPPED | OUTPATIENT
Start: 2023-01-03 | End: 2023-01-10

## 2023-01-03 RX ADMIN — IOHEXOL 85 ML: 350 INJECTION, SOLUTION INTRAVENOUS at 01:43

## 2023-01-03 NOTE — DISCHARGE INSTRUCTIONS
Follow-up with your PCP  Take Augmentin 1 tablet every 12 hours for 7 days  Return sooner to the ED if you feel worse

## 2023-01-03 NOTE — ED CARE HANDOFF
Emergency Department Sign Out Note        Sign out and transfer of care from Dr Aminah Berg  See Separate Emergency Department note  The patient, Olamide Sandoval, was evaluated by the previous provider for coughing fit, with white sputum in throat  Workup Completed:  EKG, Ultrasound or lung, CT soft tissue of neck, PE study    ED Course / Workup Pending (followup):  Ct scan of Neck and soft tissue and CT PE study and if normal discharge   Dara Soulier HEART Risk Score    Flowsheet Row Most Recent Value   Heart Score Risk Calculator    History 0 Filed at: 01/03/2023 0105   ECG 1 Filed at: 01/03/2023 0105   Age 1 Filed at: 01/03/2023 0105   Risk Factors 1 Filed at: 01/03/2023 0105   Troponin 0 Filed at: 01/03/2023 0105   HEART Score 3 Filed at: 01/03/2023 0105                             Procedures  MDM   Patient CTA chest PE study showed no pulmonary embolism or other acute inflammatory process  CT soft tissue neck showed no acute inflammatory or abnormality tissue mass along the aerodigestive tract  Finding suggestive of pansinusitis of the left maxillary sinus  Patient discharged home on a diagnosis of pansinusitis of the left sinus with a prescription of 7-days course of Augmentin  Return precautions discussed      Disposition  Final diagnoses:   Sinusitis     Time reflects when diagnosis was documented in both MDM as applicable and the Disposition within this note     Time User Action Codes Description Comment    1/3/2023  4:02 AM Maribell Morris Add [R05 9] Cough     1/3/2023  4:04 AM Maribell Morris Remove [R05 9] Cough     1/3/2023  4:04 AM Solitario Morris Add [J32 9] Sinusitis       ED Disposition     ED Disposition   Discharge    Condition   Stable    Date/Time   Tue Grady 3, 2023  4:02 AM    Comment   Olamide Sandoval discharge to home/self care                 Follow-up Information    None       Discharge Medication List as of 1/3/2023  4:10 AM      START taking these medications    Details   amoxicillin-clavulanate (AUGMENTIN) 875-125 mg per tablet Take 1 tablet by mouth every 12 (twelve) hours for 7 days, Starting Tue 1/3/2023, Until Tue 1/10/2023, Normal         CONTINUE these medications which have NOT CHANGED    Details   azelastine (ASTELIN) 0 1 % nasal spray 1 spray into each nostril 2 (two) times a day Use in each nostril as directed, Historical Med      Diclofenac Sodium (VOLTAREN) 1 % Apply 2 g topically 4 (four) times a day, Starting Mon 9/19/2022, Normal      fluticasone (FLONASE) 50 mcg/act nasal spray Starting Tue 10/30/2018, Historical Med      ketoconazole (NIZORAL) 2 % cream APPLY TO AFFECTED AREA TOPICALLY EVERY DAY, Normal      pantoprazole (PROTONIX) 40 mg tablet TAKE 1 TABLET BY MOUTH EVERY MORNING 1/2 HOUR PRIOR TO BREAKFAST, Normal      Respiratory Therapy Supplies (CareTouch CPAP & BIPAP Hose) MISC Use, Historical Med           No discharge procedures on file         ED Provider  Electronically Signed by     Josiah Mtz MD  01/03/23 0259

## 2023-01-03 NOTE — ED PROVIDER NOTES
History  Chief Complaint   Patient presents with   • Sinus Problem     Pt reports possible sinus infection, pt reports he has mucus in throat and is unable to cough it up which leads to SOB  Hx sinus infections, but pt states it has never been this bad     59-year-old male with history of right nares surgery presents with sinus congestion and cough  Patient states 2 day history of sinus congestion with postnasal drip cough  Patient states cough is initially productive of thick white mucus, but now he is unable to bring up anything with coughing  States foreign body sensation in back of throat that gives him choking spells and associated shortness of breath during the spells  Episodes last a couple seconds and occur every couple minutes  Symptoms occur throughout the night and while he is attempting to sleep  Attempted nebulizer treatment, Joanie pot, Robitussin without relief  Previously seen yesterday for the same and given nebulizer treatment without relief  No known drug allergies  Sleep apnea  Nasal surgery  Denies ethanol use, tobacco use, recreational drug use  Denies fevers, chills, sinus pressure, discharge from the ears nose or throat, sore throat, chest pains, nausea, vomiting, diaphoresis, abdominal pain, changes in bowel habits, changes in urinary habits, leg swelling, proximal nocturnal dyspnea, dyspnea on exertion, changes in physical activity, foreign body aspiration, choking on food, odynophagia, dysphagia, drooling, choking during his sleep  Prior to Admission Medications   Prescriptions Last Dose Informant Patient Reported? Taking?    Diclofenac Sodium (VOLTAREN) 1 %   No No   Sig: Apply 2 g topically 4 (four) times a day   Respiratory Therapy Supplies (CareTouch CPAP & BIPAP Hose) MISC   Yes No   Sig: Use   azelastine (ASTELIN) 0 1 % nasal spray   Yes No   Si spray into each nostril 2 (two) times a day Use in each nostril as directed   fluticasone (FLONASE) 50 mcg/act nasal spray   Yes No   Patient not taking: No sig reported   ketoconazole (NIZORAL) 2 % cream   No No   Sig: APPLY TO AFFECTED AREA TOPICALLY EVERY DAY   pantoprazole (PROTONIX) 40 mg tablet   No No   Sig: TAKE 1 TABLET BY MOUTH EVERY MORNING 1/2 HOUR PRIOR TO BREAKFAST      Facility-Administered Medications: None       Past Medical History:   Diagnosis Date   • Colon polyp    • CPAP (continuous positive airway pressure) dependence    • MARY (obstructive sleep apnea)    • Osteoarthritis        Past Surgical History:   Procedure Laterality Date   • COLONOSCOPY     • SINUS SURGERY Bilateral 2014       Family History   Problem Relation Age of Onset   • No Known Problems Mother    • Thyroid disease Family      I have reviewed and agree with the history as documented  E-Cigarette/Vaping   • E-Cigarette Use Never User      E-Cigarette/Vaping Substances   • Nicotine No    • THC No    • CBD No    • Flavoring No    • Other No    • Unknown No      Social History     Tobacco Use   • Smoking status: Never   • Smokeless tobacco: Never   Vaping Use   • Vaping Use: Never used   Substance Use Topics   • Alcohol use: No   • Drug use: No        Review of Systems   Constitutional: Negative for chills, diaphoresis and fever  HENT: Positive for congestion and postnasal drip  Negative for hearing loss  Eyes: Negative for pain and visual disturbance  Respiratory: Positive for cough and choking  Negative for chest tightness and shortness of breath  Cardiovascular: Negative for chest pain, palpitations and leg swelling  Gastrointestinal: Negative for abdominal pain, constipation, diarrhea, nausea and vomiting  Endocrine: Negative for polyuria  Genitourinary: Negative for dysuria, frequency and urgency  Neurological: Negative for weakness, numbness and headaches  All other systems reviewed and are negative        Physical Exam  ED Triage Vitals [01/02/23 2014]   Temperature Pulse Respirations Blood Pressure SpO2   98 2 °F (36 8 °C) 76 18 137/65 98 %      Temp Source Heart Rate Source Patient Position - Orthostatic VS BP Location FiO2 (%)   Oral Monitor Sitting Right arm --      Pain Score       --             Orthostatic Vital Signs  Vitals:    01/02/23 2014 01/02/23 2130   BP: 137/65 120/56   Pulse: 76 71   Patient Position - Orthostatic VS: Sitting Sitting       Physical Exam  Vitals and nursing note reviewed  Constitutional:       General: He is not in acute distress  Appearance: Normal appearance  He is obese  He is not ill-appearing  HENT:      Head: Normocephalic and atraumatic  Right Ear: External ear normal       Left Ear: External ear normal       Nose: Congestion present  No rhinorrhea  Mouth/Throat:      Mouth: Mucous membranes are moist       Pharynx: Oropharynx is clear  Eyes:      General: No scleral icterus  Extraocular Movements: Extraocular movements intact  Conjunctiva/sclera: Conjunctivae normal    Cardiovascular:      Rate and Rhythm: Normal rate and regular rhythm  Pulses: Normal pulses  Heart sounds: Normal heart sounds  Pulmonary:      Effort: Pulmonary effort is normal  No respiratory distress  Breath sounds: Normal breath sounds  No stridor  No wheezing, rhonchi or rales  Abdominal:      General: There is no distension  Palpations: Abdomen is soft  There is no mass  Tenderness: There is no abdominal tenderness  There is no right CVA tenderness, left CVA tenderness, guarding or rebound  Musculoskeletal:         General: No swelling, tenderness, deformity or signs of injury  Normal range of motion  Cervical back: Normal range of motion and neck supple  No rigidity or tenderness  Right lower leg: Edema present  Left lower leg: Edema present  Comments: Pitting edema up to the shin bilaterally  Lymphadenopathy:      Cervical: No cervical adenopathy  Skin:     General: Skin is warm and dry        Capillary Refill: Capillary refill takes less than 2 seconds  Coloration: Skin is not jaundiced  Neurological:      General: No focal deficit present  Mental Status: He is alert and oriented to person, place, and time  Psychiatric:         Mood and Affect: Mood normal          Behavior: Behavior normal          ED Medications  Medications - No data to display    Diagnostic Studies  Results Reviewed     Procedure Component Value Units Date/Time    B-Type Natriuretic Peptide(BNP) AN, CA, EA Campuses Only [[id number]]  (Normal) Collected: 01/03/23 0036    Lab Status: Final result Specimen: Blood from Arm, Right Updated: 01/03/23 0115     BNP 69 pg/mL     HS Troponin I 2hr [[id number]] Collected: 01/03/23 0036    Lab Status: In process Specimen: Blood from Arm, Right Updated: 01/03/23 0040    FLU/RSV/COVID - if FLU/RSV clinically relevant [[id number]]  (Normal) Collected: 01/02/23 2305    Lab Status: Final result Specimen: Nares from Nose Updated: 01/03/23 0012     SARS-CoV-2 Negative     INFLUENZA A PCR Negative     INFLUENZA B PCR Negative     RSV PCR Negative    Narrative:      FOR PEDIATRIC PATIENTS - copy/paste COVID Guidelines URL to browser: [email or web address]    SARS-CoV-2 assay is a Nucleic Acid Amplification assay intended for the  qualitative detection of nucleic acid from SARS-CoV-2 in nasopharyngeal  swabs  Results are for the presumptive identification of SARS-CoV-2 RNA  Positive results are indicative of infection with SARS-CoV-2, the virus  causing COVID-19, but do not rule out bacterial infection or co-infection  with other viruses  Laboratories within the [place] and its  territories are required to report all positive results to the appropriate  public health authorities  Negative results do not preclude SARS-CoV-2  infection and should not be used as the sole basis for treatment or other  patient management decisions   Negative results must be combined with  clinical observations, patient history, and epidemiological information  This test has not been FDA cleared or approved  This test has been authorized by FDA under an Emergency Use Authorization  (EUA)  This test is only authorized for the duration of time the  declaration that circumstances exist justifying the authorization of the  emergency use of an in vitro diagnostic tests for detection of SARS-CoV-2  virus and/or diagnosis of COVID-19 infection under section 564(b)(1) of  the Act, 21 U  S C  159HAD-3(N)(9), unless the authorization is terminated  or revoked sooner  The test has been validated but independent review by FDA  and CLIA is pending  Test performed using [employer] GeneXpert: This RT-PCR assay targets N2,  a region unique to SARS-CoV-2  A conserved region in the E-gene was chosen  for pan-Sarbecovirus detection which includes SARS-CoV-2  According to CMS-2020-01-R, this platform meets the definition of high-throughput technology      HS Troponin I 4hr [[id number]]     Lab Status: No result Specimen: Blood     HS Troponin 0hr (reflex protocol) [[id number]]  (Normal) Collected: 01/02/23 2305    Lab Status: Final result Specimen: Blood from Arm, Right Updated: 01/02/23 2359     hs TnI 0hr 5 ng/L     Comprehensive metabolic panel [[id number]]  (Abnormal) Collected: 01/02/23 2305    Lab Status: Final result Specimen: Blood from Arm, Right Updated: 01/02/23 2349     Sodium 139 mmol/L      Potassium 3 7 mmol/L      Chloride 110 mmol/L      CO2 24 mmol/L      ANION GAP 5 mmol/L      BUN 12 mg/dL      Creatinine 0 91 mg/dL      Glucose 102 mg/dL      Calcium 9 1 mg/dL      AST 16 U/L      ALT 22 U/L      Alkaline Phosphatase 78 U/L      Total Protein 6 8 g/dL      Albumin 3 7 g/dL      Total Bilirubin 0 44 mg/dL      eGFR 90 ml/min/1 73sq m     Narrative:      [place] guidelines for Chronic Kidney Disease (CKD):   •  Stage 1 with normal or high GFR (GFR > 90 mL/min/1 73 square meters)  •  Stage 2 Mild CKD (GFR = 60-89 mL/min/1 73 square meters)  •  Stage 3A Moderate CKD (GFR = 45-59 mL/min/1 73 square meters)  •  Stage 3B Moderate CKD (GFR = 30-44 mL/min/1 73 square meters)  •  Stage 4 Severe CKD (GFR = 15-29 mL/min/1 73 square meters)  •  Stage 5 End Stage CKD (GFR <15 mL/min/1 73 square meters)  Note: GFR calculation is accurate only with a steady state creatinine    Lipase [[id number]]  (Normal) Collected: 01/02/23 2305    Lab Status: Final result Specimen: Blood from Arm, Right Updated: 01/02/23 2349     Lipase 28 u/L     CBC and differential [[id number]]  (Abnormal) Collected: 01/02/23 2305    Lab Status: Final result Specimen: Blood from Arm, Right Updated: 01/02/23 2321     WBC 11 70 Thousand/uL      RBC 5 34 Million/uL      Hemoglobin 15 2 g/dL      Hematocrit 46 1 %      MCV 86 fL      MCH 28 5 pg      MCHC 33 0 g/dL      RDW 13 1 %      MPV 9 4 fL      Platelets 673 Thousands/uL      nRBC 0 /100 WBCs      Neutrophils Relative 66 %      Immat GRANS % 0 %      Lymphocytes Relative 20 %      Monocytes Relative 8 %      Eosinophils Relative 5 %      Basophils Relative 1 %      Neutrophils Absolute 7 64 Thousands/µL      Immature Grans Absolute 0 05 Thousand/uL      Lymphocytes Absolute 2 36 Thousands/µL      Monocytes Absolute 0 98 Thousand/µL      Eosinophils Absolute 0 56 Thousand/µL      Basophils Absolute 0 11 Thousands/µL                  XR chest pa & lateral    (Results Pending)   CT soft tissue neck    (Results Pending)   CTA ED chest PE study    (Results Pending)         Procedures  ECG 12 Lead Documentation Only    Date/Time: 1/3/2023 1:00 AM  Performed by: Richar Almaguer MD  Authorized by: Richar Almgauer MD       EKG January 2, 2023 at 2309 hrs   normal sinus rhythm with 3 monofocal PVCs, rate of 70, normal CA interval, normal QRS interval, normal QT interval, , normal axis, T wave flattening in leads III, aVL, aVF, V6, no pathological Q waves, no de Crooks, no Wellens, new PVCs when compared to previous EKG on May 14, 2015  ED Course  ED Course as of 01/03/23 0115   Mon Jan 02, 2023   2345 WBC(!): 11 70   Tue Jan 03, 2023   0015 FLU/RSV/COVID - if FLU/RSV clinically relevant  Negative     0016 hs TnI 0hr: 5   0113 Signed out to [facility] Risk Score    Flowsheet Row Most Recent Value   Heart Score Risk Calculator    History 0 Filed at: 01/03/2023 0105   ECG 1 Filed at: 01/03/2023 0105   Age 1 Filed at: 01/03/2023 0105   Risk Factors 1 Filed at: 01/03/2023 0105   Troponin 0 Filed at: 01/03/2023 0105   HEART Score 3 Filed at: 01/03/2023 0105                                Medical Decision Making  63-year-old male with episodes of coughing  Patient noted to be able to speak and breathe during coughing episodes  Noted episodes of runs of V  Tach, bigeminy, PVCs during coughing episodes  Nontoxic, non-ill-appearing, vitals within normal limits, cardiac exam within normal limits, lung sounds clear, HEENT notable for mild congestion of the nares bilaterally, abdomen soft nontender, bilateral pitting lower extremity edema up to the midshin  Differential includes but not limited to pneumonia, ACS, hiatal hernia, foreign body aspiration, viral URI  New EKG changes, no STEMI noted, no ischemic changes noted, initial troponin 5, initial heart score 3, will require further cardiac management  We will continue to monitor  Chest x-ray shows deep sulcus sign on the right side  No consolidations, vascular congestion, cardiomegaly, pleural effusions noted  Ultrasound of the lungs show sliding of the pleura  Pneumothorax unlikely at this time  Ordered CT soft tissues of the neck for possible foreign body aspiration/foreign body  CT PE study for PE, lung disease, subtle changes not appreciated on chest x-ray  Viral panel ordered and negative  Mildly elevated white blood cell count  Nonspecific at this time    Patient handed off to   Brittanie oMrris    Amount and/or Complexity of Data Reviewed  External Data Reviewed: labs, radiology and ECG  Details: Comparison of new changes compared to previous  Previous labs evaluated as needed  New finding of deep sulcus sign compared to previous rib x-ray/CXR  Labs: ordered  Decision-making details documented in ED Course  Radiology: ordered and independent interpretation performed  Decision-making details documented in ED Course  ECG/medicine tests: ordered and independent interpretation performed  Decision-making details documented in ED Course  Disposition  Final diagnoses:   None     ED Disposition     None      Follow-up Information    None         Patient's Medications   Discharge Prescriptions    No medications on file     No discharge procedures on file  PDMP Review     None           ED Provider  Attending physically available and evaluated Simone Guardian  I managed the patient along with the ED Attending      Electronically Signed by         Vandana Prado MD  01/03/23 4666

## 2023-01-03 NOTE — ED ATTENDING ATTESTATION
1/2/2023  I, Pablo Clemente MD, saw and evaluated the patient  I have discussed the patient with the resident/non-physician practitioner and agree with the resident's/non-physician practitioner's findings, Plan of Care, and MDM as documented in the resident's/non-physician practitioner's note, except where noted  All available labs and Radiology studies were reviewed  I was present for key portions of any procedure(s) performed by the resident/non-physician practitioner and I was immediately available to provide assistance  At this point I agree with the current assessment done in the Emergency Department  I have conducted an independent evaluation of this patient a history and physical is as follows: This is a 69-year-old male patient presenting to the ED today for nasal congestion  Patient has had the symptoms for the past 24 to 48 hours  He has visited [facility] for this, was given symptomatic treatment, nebulizer treatment, without significant improvement in his symptoms  Patient comes to our ED today with orthopnea, and white frothy sputum  Patient states that this is atypical for his sinus congestion  He has taken his symptomatic treatment, used nasal irrigation device, without improvement in his symptoms  On exam he does have peripheral edema, 2+ pitting, without significant rales in his lungs  His lung sounds are difficult to auscultate however due to his body habitus  He does not exhibit JVD  Patient does not have any conversational dyspnea in the room  He does refuse to lay down, and states that he would get too short of breath  His differential diagnosis includes: Sinusitis versus VTE versus fluid overload versus other  Patient had CBC showing a slight leukocytosis, his metabolic panel was for the most part unremarkable  He had a flu COVID RSV swab which was negative  His lipase was normal, troponin was only 5    He underwent a chest x-ray showing deep sulcus on the Ok to order CT guided right SI joint injection with IR - use depomedrol 40mg for injection dx chronic right SI joint pain.   right side, without any other findings for pneumothorax  Patient is clinically stable, does not appear to have significant shortness of breath  Bedside ultrasound does not yield any evidence for pneumothorax  He has lung sliding throughout his lungs  Patient was signed out to Dr Dina Celestin at shift change pending CT soft tissues neck, chest for further characterization of his symptoms  ED Course  ED Course as of 01/03/23 0034   Mon Jan 02, 2023   7054 Chest x-ray showing right-sided deep sulcus sign   CT ordered         Critical Care Time  Procedures

## 2023-02-24 LAB
ALBUMIN SERPL-MCNC: 4.1 G/DL (ref 3.8–4.8)
ALBUMIN/GLOB SERPL: 1.6 {RATIO} (ref 1.2–2.2)
ALP SERPL-CCNC: 95 IU/L (ref 44–121)
ALT SERPL-CCNC: 20 IU/L (ref 0–44)
AST SERPL-CCNC: 20 IU/L (ref 0–40)
BILIRUB SERPL-MCNC: 0.3 MG/DL (ref 0–1.2)
BUN SERPL-MCNC: 18 MG/DL (ref 8–27)
BUN/CREAT SERPL: 19 (ref 10–24)
CALCIUM SERPL-MCNC: 9.3 MG/DL (ref 8.6–10.2)
CHLORIDE SERPL-SCNC: 105 MMOL/L (ref 96–106)
CHOLEST SERPL-MCNC: 176 MG/DL (ref 100–199)
CO2 SERPL-SCNC: 21 MMOL/L (ref 20–29)
CREAT SERPL-MCNC: 0.95 MG/DL (ref 0.76–1.27)
EGFR: 90 ML/MIN/1.73
GLOBULIN SER-MCNC: 2.6 G/DL (ref 1.5–4.5)
GLUCOSE SERPL-MCNC: 88 MG/DL (ref 70–99)
HDLC SERPL-MCNC: 53 MG/DL
LDLC SERPL CALC-MCNC: 111 MG/DL (ref 0–99)
POTASSIUM SERPL-SCNC: 4.6 MMOL/L (ref 3.5–5.2)
PROT SERPL-MCNC: 6.7 G/DL (ref 6–8.5)
SODIUM SERPL-SCNC: 141 MMOL/L (ref 134–144)
TRIGL SERPL-MCNC: 62 MG/DL (ref 0–149)

## 2023-08-24 ENCOUNTER — TELEMEDICINE (OUTPATIENT)
Dept: FAMILY MEDICINE CLINIC | Facility: OTHER | Age: 63
End: 2023-08-24
Payer: COMMERCIAL

## 2023-08-24 DIAGNOSIS — U07.1 COVID: Primary | ICD-10-CM

## 2023-08-24 PROCEDURE — 99213 OFFICE O/P EST LOW 20 MIN: CPT | Performed by: FAMILY MEDICINE

## 2023-08-24 NOTE — PROGRESS NOTES
COVID-19 Outpatient Progress Note    Assessment/Plan:    Problem List Items Addressed This Visit        Other    COVID - Primary      Disposition:     Discussed symptom directed medication options with patient. Patient on day #9 of symptoms. Recommend he continue to mask in public for 1-2 more days. No other treatment needed. I have spent a total time of 8 minutes on the day of the encounter for this patient including discussing prognosis, instructions for management, patient and family education, impressions, documenting in the medical record and obtaining or reviewing history. Encounter provider: Rosario Page DO     Provider located at: [address]  [address] [facility] [address]     Recent Visits  No visits were found meeting these conditions. Showing recent visits within past 7 days and meeting all other requirements  Today's Visits  Date Type Provider Dept   08/24/23 Telemedicine Leilani Ferrell DO  [facility]   Showing today's visits and meeting all other requirements  Future Appointments  No visits were found meeting these conditions. Showing future appointments within next 150 days and meeting all other requirements     This virtual check-in was done via [employer] and patient was informed that this is a secure, HIPAA-compliant platform. He agrees to proceed. Patient agrees to participate in a virtual check in via telephone or video visit instead of presenting to the office to address urgent/immediate medical needs. Patient is aware this is a billable service. He acknowledged consent and understanding of privacy and security of the video platform. The patient has agreed to participate and understands they can discontinue the visit at any time. After connecting through [facility], the patient was identified by name and date of birth.  Kvng Iverson was informed that this was a telemedicine visit and that the exam was being conducted confidentially over secure lines. My office door was closed. No one else was in the room. Dina Cruz acknowledged consent and understanding of privacy and security of the telemedicine visit. I informed the patient that I have reviewed his record in Epic and presented the opportunity for him to ask any questions regarding the visit today. The patient agreed to participate. Verification of patient location:  Patient is located in the following state in which I hold an active license: [place]    Subjective:   Dina Cruz is a 61 y.o. male who is concerned about COVID-19. Patient is currently asymptomatic. Patient's symptoms include fatigue. Patient denies fever, chills, malaise, congestion, rhinorrhea, sore throat, anosmia, loss of taste, cough, shortness of breath, chest tightness, abdominal pain, nausea, vomiting, diarrhea, myalgias and headaches. - Date of symptom onset: 8/16/2023      COVID-19 vaccination status: Fully vaccinated with booster    Sx began while on a cruise -- states that "almost everybody" on the ship got COVID    Lab Results   Component Value Date    SARSCOV2 Negative 01/02/2023    SARSCOV2 NOT DETECTED 01/22/2021       Review of Systems   Constitutional: Positive for fatigue. Negative for activity change, chills and fever. HENT: Negative for congestion, ear pain, rhinorrhea, sinus pain and sore throat. Eyes: Negative for pain, itching and visual disturbance. Respiratory: Negative for cough, chest tightness and shortness of breath. Cardiovascular: Negative for chest pain, palpitations and leg swelling. Gastrointestinal: Negative for abdominal pain, constipation, diarrhea, nausea and vomiting. Endocrine: Negative for cold intolerance and heat intolerance. Genitourinary: Negative for dysuria. Musculoskeletal: Negative for myalgias. Skin: Negative for color change and rash. Neurological: Negative for dizziness, syncope and headaches.    Hematological: Negative for adenopathy. Psychiatric/Behavioral: Negative for dysphoric mood. The patient is not nervous/anxious. Current Outpatient Medications on File Prior to Visit   Medication Sig   • azelastine (ASTELIN) 0.1 % nasal spray 1 spray into each nostril 2 (two) times a day Use in each nostril as directed   • Diclofenac Sodium (VOLTAREN) 1 % Apply 2 g topically 4 (four) times a day   • fluticasone (FLONASE) 50 mcg/act nasal spray  (Patient not taking: Reported on 4/21/2022)   • ketoconazole (NIZORAL) 2 % cream APPLY TO AFFECTED AREA TOPICALLY EVERY DAY   • pantoprazole (PROTONIX) 40 mg tablet TAKE 1 TABLET BY MOUTH EVERY MORNING 1/2 HOUR PRIOR TO BREAKFAST   • Respiratory Therapy Supplies (CareTouch CPAP & BIPAP Hose) MISC Use       Objective: There were no vitals taken for this visit. Physical Exam  Vitals and nursing note reviewed. Constitutional:       General: He is not in acute distress. Appearance: Normal appearance. He is well-developed. He is not ill-appearing. Comments: Exam limited by virtual visit   HENT:      Head: Normocephalic and atraumatic. Eyes:      General: No scleral icterus. Right eye: No discharge. Left eye: No discharge. Conjunctiva/sclera: Conjunctivae normal.      Pupils: Pupils are equal, round, and reactive to light. Pulmonary:      Effort: Pulmonary effort is normal. No respiratory distress. Skin:     Coloration: Skin is not pale. Findings: No erythema. Neurological:      Mental Status: He is alert and oriented to person, place, and time. Cranial Nerves: No cranial nerve deficit.       Coordination: Coordination normal.   Psychiatric:         Mood and Affect: Mood normal.         Behavior: Behavior normal.       Leilani Mcghee DO

## 2023-09-29 ENCOUNTER — NURSE TRIAGE (OUTPATIENT)
Age: 63
End: 2023-09-29

## 2023-09-29 NOTE — TELEPHONE ENCOUNTER
SPOKE WITH PT, REPORTS CONSTIPATION OVER THE PAST MONTH, HE IS HAVING BM EVERY DAY, STOOL IS HARD. PT TAKING DUCOLAX DAILY WITH DINORAH Reynoso. PT ADVISED TO INCREASE HYDRATION, ACTIVITY, HIGH FIBER DIET, CAN TRIAL STOOL SOFTENER, MIRALAX 1-2 TIMES DAILY AND TITRATE AS NEEDED. HE WILL CALL BACK IF SYMPTOMS PERSIST.

## 2023-09-29 NOTE — TELEPHONE ENCOUNTER
Regarding: symptom call  ----- Message from Mateus Clayton sent at 9/29/2023  9:19 AM EDT -----  Patient called and stated he has been having constipation for a couple of week and has been taking laxatives but he wants some advice of what else he can do please review and reach out thank you

## 2024-02-05 ENCOUNTER — OFFICE VISIT (OUTPATIENT)
Dept: GASTROENTEROLOGY | Facility: CLINIC | Age: 64
End: 2024-02-05
Payer: COMMERCIAL

## 2024-02-05 ENCOUNTER — TELEPHONE (OUTPATIENT)
Age: 64
End: 2024-02-05

## 2024-02-05 ENCOUNTER — TELEPHONE (OUTPATIENT)
Dept: GASTROENTEROLOGY | Facility: CLINIC | Age: 64
End: 2024-02-05

## 2024-02-05 VITALS
WEIGHT: 290 LBS | SYSTOLIC BLOOD PRESSURE: 130 MMHG | HEIGHT: 69 IN | BODY MASS INDEX: 42.95 KG/M2 | HEART RATE: 66 BPM | DIASTOLIC BLOOD PRESSURE: 77 MMHG

## 2024-02-05 DIAGNOSIS — K59.04 CHRONIC IDIOPATHIC CONSTIPATION: ICD-10-CM

## 2024-02-05 DIAGNOSIS — K57.90 DIVERTICULOSIS: ICD-10-CM

## 2024-02-05 DIAGNOSIS — R19.4 CHANGE IN BOWEL HABIT: Primary | ICD-10-CM

## 2024-02-05 PROCEDURE — 99213 OFFICE O/P EST LOW 20 MIN: CPT | Performed by: NURSE PRACTITIONER

## 2024-02-05 RX ORDER — LINACLOTIDE 72 UG/1
72 CAPSULE, GELATIN COATED ORAL DAILY
Qty: 90 CAPSULE | Refills: 1 | Status: SHIPPED | OUTPATIENT
Start: 2024-02-05

## 2024-02-05 RX ORDER — POLYETHYLENE GLYCOL 3350, SODIUM CHLORIDE, SODIUM BICARBONATE, POTASSIUM CHLORIDE 420; 11.2; 5.72; 1.48 G/4L; G/4L; G/4L; G/4L
4000 POWDER, FOR SOLUTION ORAL ONCE
Qty: 4000 ML | Refills: 0 | Status: SHIPPED | OUTPATIENT
Start: 2024-02-05 | End: 2024-02-05

## 2024-02-05 NOTE — TELEPHONE ENCOUNTER
Scheduled date of colonoscopy (as of today):3/26/24  Physician performing colonoscopy:Dr Cooper   Location of colonoscopy:[facility]   Bowel prep reviewed with patient:aura  Instructions reviewed with patient by:sb  Clearances: none

## 2024-02-05 NOTE — PROGRESS NOTES
[facility] [place] - Outpatient Follow-up Note  Frank G Ruzicka 63 y.o. male MRN: [id number]  Encounter: [id number]          ASSESSMENT AND PLAN:      1. Change in bowel habit  Patient reports change in bowel habit was increasing difficulty moving his bowels since he retired in August.  He is now taking a daily fiber supplement, MiraLAX, and a stimulant laxative in order to move his bowels.  He reports the most difficulty with the first bowel movement, however subsequent bowel movements afterwards tend to be easier.  He denies any changes in his diet, however does note less activity after he retired.  Recent CBC, CMP unremarkable.  Will obtain a thyroid panel in addition to ensure this is not contributing to symptoms and update colonoscopy as his last exam was in 2021.  He will continue daily fiber supplementation and increase hydration and activity.  He is wanting to take 1 medication instead of several in order to help him move his bowels on a regular basis.  Linzess 72 mcg prescribed to be taken half hour before breakfast.  Discussed if he does not have any improvement in his bowel movements then can increase to taking 2 daily to equal the 145 mcg dose.  If this is more effective for him then he will call the office for a new prescription.  Colonoscopy prep and procedure explained, will use GoLytely in split dosing.  -     TSH, 3rd generation with Free T4 reflex; Future  -     Colonoscopy; Future; Expected date: 02/05/2024  -     polyethylene glycol-electrolytes (TriLyte) 4000 mL solution; Take 4,000 mL by mouth once for 1 dose Take 4000 mL by mouth once for 1 dose. Use as directed    2. Diverticulosis  Currently asymptomatic  -Continue daily fiber supplementation with goal of 25 to 30 g daily    Follow-up after colonoscopy     _________________________________________    SUBJECTIVE:  Frank G Ruzicka is a 63 y.o. male with history of osteoarthritis, sleep apnea, sinus surgery, and BMI 42  presenting for follow-up due to change in bowel habit.  He reports he retired in August and following this he noticed a change in his bowel habits with increasing difficulty having a bowel movement.  He reports prior to that he was on fiber supplementation daily with regular bowel movements, however he now has to take fiber, MiraLAX, and Dulcolax in order to produce a bowel movement.  He has some abdominal discomfort when he he has not moved his bowels which resolves afterwards.  Denies any nausea or vomiting.  Reports history o anal fissures with intermittent rectal bleeding due to this, but does not have any issues currently.  His last colonoscopy was in 2021 due to history of polyps and he had left-sided diverticulosis and mixed hemorrhoids.  He was recommended to repeat in 5 years due to history of tubular adenomatous.        Answers submitted by the patient for this visit:  Abdominal Pain Questionnaire (Submitted on 2/4/2024)  Chief Complaint: Abdominal pain  Chronicity: recurrent  Onset: more than 1 year ago  Onset quality: gradual  Frequency: every several days  Episode duration: 3 Days  Progression since onset: gradually improving  Pain location: epigastric region  Pain - numeric: 3/10  Pain quality: cramping  Radiates to: suprapubic region  anorexia: No  arthralgias: No  belching: No  constipation: Yes  diarrhea: Yes  dysuria: No  fever: No  flatus: Yes  frequency: No  headaches: No  hematochezia: No  hematuria: No  melena: No  myalgias: No  nausea: No  weight loss: No  vomiting: No  Aggravated by: bowel movement  Relieved by: bowel movements    REVIEW OF SYSTEMS IS OTHERWISE NEGATIVE.      Historical Information   Past Medical History:   Diagnosis Date    Colon polyp     CPAP (continuous positive airway pressure) dependence     MARY (obstructive sleep apnea)     Osteoarthritis      Past Surgical History:   Procedure Laterality Date    COLONOSCOPY      SINUS SURGERY Bilateral 2014     Social History   Social  "History     Substance and Sexual Activity   Alcohol Use No     Social History     Substance and Sexual Activity   Drug Use No     Social History     Tobacco Use   Smoking Status Never   Smokeless Tobacco Never     Family History   Problem Relation Age of Onset    Asthma Mother     Breast cancer Mother     Cancer Mother     Early death Mother     Thyroid disease Family        Meds/Allergies       Current Outpatient Medications:     fluticasone (FLONASE) 50 mcg/act nasal spray    polyethylene glycol-electrolytes (TriLyte) 4000 mL solution    Respiratory Therapy Supplies (CareTouch CPAP & BIPAP Hose) MISC    azelastine (ASTELIN) 0.1 % nasal spray    Diclofenac Sodium (VOLTAREN) 1 %    ketoconazole (NIZORAL) 2 % cream    pantoprazole (PROTONIX) 40 mg tablet    No Known Allergies        Objective     Blood pressure 130/77, pulse 66, height 5' 9\" (1.753 m), weight 132 kg (290 lb). Body mass index is 42.83 kg/m².      PHYSICAL EXAM:      General Appearance:   Alert, cooperative, no distress   HEENT:   Normocephalic, atraumatic, anicteric.     Neck:  Supple, symmetrical, trachea midline   Lungs:   Clear to auscultation bilaterally; no rales, rhonchi or wheezing; respirations unlabored    Heart::   Regular rate and rhythm; no murmur.   Abdomen:   Soft, non-tender, non-distended; normal bowel sounds; no masses, no organomegaly    Genitalia:   Deferred    Rectal:   Deferred    Extremities:  No cyanosis, clubbing or edema    Skin:  No jaundice, rashes, or lesions    Lymph nodes:  No palpable cervical lymphadenopathy        Lab Results:   No visits with results within 1 Day(s) from this visit.   Latest known visit with results is:   Orders Only on 02/22/2023   Component Date Value    Glucose, Random 02/22/2023 88     BUN 02/22/2023 18     Creatinine 02/22/2023 0.95     eGFR 02/22/2023 90     SL AMB BUN/CREATININE RA* 02/22/2023 19     Sodium 02/22/2023 141     Potassium 02/22/2023 4.6     Chloride 02/22/2023 105     CO2 " 02/22/2023 21     CALCIUM 02/22/2023 9.3     Protein, Total 02/22/2023 6.7     Albumin 02/22/2023 4.1     Globulin, Total 02/22/2023 2.6     Albumin/Globulin Ratio 02/22/2023 1.6     TOTAL BILIRUBIN 02/22/2023 0.3     Alk Phos Isoenzymes 02/22/2023 95     AST 02/22/2023 20     ALT 02/22/2023 20     Cholesterol, Total 02/22/2023 176     Triglycerides 02/22/2023 62     HDL 02/22/2023 53     LDL Calculated 02/22/2023 111 (H)          Radiology Results:   No results found.

## 2024-02-05 NOTE — PATIENT INSTRUCTIONS
-Get thyroid panel done, this is blood work    -Continue taking fiber supplementation daily    -Trial Linzess 72 mcg daily half hour before breakfast for constipation.  If this does not improve your symptoms after 1 week then you can take 2 capsules daily to equal the 145 mg dose.  If this works better for you then please call the office and we can prescribe the higher dose.

## 2024-02-05 NOTE — TELEPHONE ENCOUNTER
PA for Linzess 72    Submitted via  []CMM-KEY   [x]Surescripts-Case ID # Case ID:[id number]   []Faxed to plan   []Other website   []Phone call Case ID #     Office notes sent, clinical questions answered. Awaiting determination

## 2024-02-06 NOTE — TELEPHONE ENCOUNTER
PA for Linzess Approved   Date(s) approved until 2/5/2025  Case #    Patient advised by [x] [email or web address] Message                      [] Phone call       Pharmacy advised by [x]Fax                                     []Phone call    Approval letter scanned into Media Yes

## 2024-03-07 ENCOUNTER — OFFICE VISIT (OUTPATIENT)
Dept: FAMILY MEDICINE CLINIC | Facility: OTHER | Age: 64
End: 2024-03-07
Payer: COMMERCIAL

## 2024-03-07 VITALS
HEART RATE: 66 BPM | DIASTOLIC BLOOD PRESSURE: 70 MMHG | SYSTOLIC BLOOD PRESSURE: 112 MMHG | HEIGHT: 69 IN | OXYGEN SATURATION: 97 % | RESPIRATION RATE: 18 BRPM | TEMPERATURE: 98.2 F | BODY MASS INDEX: 42.21 KG/M2 | WEIGHT: 285 LBS

## 2024-03-07 DIAGNOSIS — S99.911A RIGHT ANKLE INJURY, INITIAL ENCOUNTER: Primary | ICD-10-CM

## 2024-03-07 PROBLEM — I47.20 VENTRICULAR TACHYCARDIA, UNSPECIFIED (HCC): Status: ACTIVE | Noted: 2024-03-07

## 2024-03-07 PROCEDURE — 99214 OFFICE O/P EST MOD 30 MIN: CPT | Performed by: FAMILY MEDICINE

## 2024-03-07 RX ORDER — NAPROXEN 500 MG/1
500 TABLET ORAL 2 TIMES DAILY WITH MEALS
Qty: 28 TABLET | Refills: 0 | Status: SHIPPED | OUTPATIENT
Start: 2024-03-07

## 2024-03-07 NOTE — PROGRESS NOTES
Name: Frank G Ruzicka      : 1960      MRN: [id number]  Encounter Provider: Leilani Mcghee DO  Encounter Date: 3/7/2024   Encounter department: [facility]    Assessment & Plan     1. Right ankle injury, initial encounter  Assessment & Plan:  Acute sprain likely  Recommend naproxen BID x 2 weeks and RICE  If not improving, or sx worsen, may get XR to eval for possible fx    Orders:  -     naproxen (Naprosyn) 500 mg tablet; Take 1 tablet (500 mg total) by mouth 2 (two) times a day with meals  -     XR ankle 3+ vw right; Future; Expected date: 2024         Return for Next scheduled follow up.    The patient indicates understanding of these issues and agrees with the plan.        Subjective     Chief Complaint   Patient presents with    was walking up the stairs and heard a crack on his right an     Started workout - 30 min resistance bands       Ankle Injury   The incident occurred 6 to 12 hours ago. The incident occurred at home. The injury mechanism is unknown. The pain is present in the right ankle. The quality of the pain is described as aching. The pain is mild. The pain has been Fluctuating since onset. Pertinent negatives include no inability to bear weight, loss of motion, loss of sensation, muscle weakness, numbness or tingling. He reports no foreign bodies present. The symptoms are aggravated by movement and palpation. He has tried nothing for the symptoms.       Review of Systems   Constitutional:  Negative for activity change, fatigue and fever.   HENT:  Negative for congestion, ear pain, sinus pain and sore throat.    Eyes:  Negative for pain, itching and visual disturbance.   Respiratory:  Negative for cough and shortness of breath.    Cardiovascular:  Negative for chest pain, palpitations and leg swelling.   Gastrointestinal:  Negative for abdominal pain, constipation, diarrhea, nausea and vomiting.   Endocrine: Negative for cold intolerance and heat intolerance.  "  Genitourinary:  Negative for dysuria.   Musculoskeletal:  Positive for arthralgias (right ankle per HPI). Negative for myalgias.   Skin:  Negative for color change and rash.   Neurological:  Negative for dizziness, tingling, syncope, numbness and headaches.   Hematological:  Negative for adenopathy.   Psychiatric/Behavioral:  Negative for dysphoric mood. The patient is not nervous/anxious.        Current Outpatient Medications on File Prior to Visit   Medication Sig    fluticasone (FLONASE) 50 mcg/act nasal spray     linaCLOtide (Linzess) 72 MCG CAPS Take 72 mcg by mouth in the morning 1/2 hour before breakfast    Respiratory Therapy Supplies (CareTouch CPAP & BIPAP Hose) MISC Use    azelastine (ASTELIN) 0.1 % nasal spray 1 spray into each nostril 2 (two) times a day Use in each nostril as directed (Patient not taking: Reported on 2/5/2024)    pantoprazole (PROTONIX) 40 mg tablet TAKE 1 TABLET BY MOUTH EVERY MORNING 1/2 HOUR PRIOR TO BREAKFAST (Patient not taking: Reported on 2/5/2024)    polyethylene glycol-electrolytes (TriLyte) 4000 mL solution Take 4,000 mL by mouth once for 1 dose Take 4000 mL by mouth once for 1 dose. Use as directed       Objective     /70   Pulse 66   Temp 98.2 °F (36.8 °C)   Resp 18   Ht 5' 9\" (1.753 m)   Wt 129 kg (285 lb)   SpO2 97%   BMI 42.09 kg/m²     Physical Exam  Vitals and nursing note reviewed.   Constitutional:       General: He is not in acute distress.     Appearance: Normal appearance. He is well-developed. He is not ill-appearing.      Comments: Body mass index is 42.09 kg/m².    HENT:      Head: Normocephalic and atraumatic.   Eyes:      General: No scleral icterus.        Right eye: No discharge.         Left eye: No discharge.      Conjunctiva/sclera: Conjunctivae normal.      Pupils: Pupils are equal, round, and reactive to light.   Pulmonary:      Effort: Pulmonary effort is normal. No respiratory distress.   Musculoskeletal:      Cervical back: Normal " range of motion.      Right lower leg: No edema.      Left lower leg: No edema.      Right ankle: Ecchymosis (mild) present. No swelling or deformity. Tenderness present over the ATF ligament. No lateral malleolus, medial malleolus, posterior TF ligament or base of 5th metatarsal tenderness.      Right Achilles Tendon: Normal.      Left ankle: Normal.   Skin:     Coloration: Skin is not jaundiced or pale.      Findings: No erythema.   Neurological:      General: No focal deficit present.      Mental Status: He is alert and oriented to person, place, and time.      Cranial Nerves: No cranial nerve deficit.      Coordination: Coordination normal.   Psychiatric:         Mood and Affect: Mood normal.         Behavior: Behavior normal.       Leilani Mcghee, DO

## 2024-03-11 PROBLEM — S99.911A RIGHT ANKLE INJURY, INITIAL ENCOUNTER: Status: ACTIVE | Noted: 2024-03-11

## 2024-03-11 PROBLEM — I47.20 VENTRICULAR TACHYCARDIA, UNSPECIFIED (HCC): Status: RESOLVED | Noted: 2024-03-07 | Resolved: 2024-03-11

## 2024-03-11 NOTE — ASSESSMENT & PLAN NOTE
Acute sprain likely  Recommend naproxen BID x 2 weeks and RICE  If not improving, or sx worsen, may get XR to eval for possible fx

## 2024-03-12 ENCOUNTER — ANESTHESIA EVENT (OUTPATIENT)
Dept: ANESTHESIOLOGY | Facility: HOSPITAL | Age: 64
End: 2024-03-12

## 2024-03-12 ENCOUNTER — ANESTHESIA (OUTPATIENT)
Dept: ANESTHESIOLOGY | Facility: HOSPITAL | Age: 64
End: 2024-03-12

## 2024-03-21 ENCOUNTER — OFFICE VISIT (OUTPATIENT)
Dept: FAMILY MEDICINE CLINIC | Facility: OTHER | Age: 64
End: 2024-03-21
Payer: COMMERCIAL

## 2024-03-21 VITALS
SYSTOLIC BLOOD PRESSURE: 126 MMHG | HEART RATE: 64 BPM | WEIGHT: 287.2 LBS | BODY MASS INDEX: 42.54 KG/M2 | OXYGEN SATURATION: 96 % | TEMPERATURE: 98.2 F | RESPIRATION RATE: 18 BRPM | HEIGHT: 69 IN | DIASTOLIC BLOOD PRESSURE: 80 MMHG

## 2024-03-21 DIAGNOSIS — Z12.5 SCREENING PSA (PROSTATE SPECIFIC ANTIGEN): ICD-10-CM

## 2024-03-21 DIAGNOSIS — Z13.6 SCREENING FOR CARDIOVASCULAR CONDITION: ICD-10-CM

## 2024-03-21 DIAGNOSIS — Z13.1 SCREENING FOR DIABETES MELLITUS: ICD-10-CM

## 2024-03-21 DIAGNOSIS — E66.01 MORBID OBESITY WITH BMI OF 40.0-44.9, ADULT (HCC): ICD-10-CM

## 2024-03-21 DIAGNOSIS — Z00.00 ANNUAL PHYSICAL EXAM: Primary | ICD-10-CM

## 2024-03-21 PROBLEM — K59.04 CHRONIC IDIOPATHIC CONSTIPATION: Status: ACTIVE | Noted: 2024-03-21

## 2024-03-21 PROCEDURE — 99396 PREV VISIT EST AGE 40-64: CPT | Performed by: FAMILY MEDICINE

## 2024-03-21 NOTE — PATIENT INSTRUCTIONS
Medicare Preventive Visit Patient Instructions  Thank you for completing your Welcome to Medicare Visit or Medicare Annual Wellness Visit today. Your next wellness visit will be due in one year (3/22/2025).  The screening/preventive services that you may require over the next 5-10 years are detailed below. Some tests may not apply to you based off risk factors and/or age. Screening tests ordered at today's visit but not completed yet may show as past due. Also, please note that scanned in results may not display below.  Preventive Screenings:  Service Recommendations Previous Testing/Comments   Colorectal Cancer Screening  Colonoscopy    Fecal Occult Blood Test (FOBT)/Fecal Immunochemical Test (FIT)  Fecal DNA/Cologuard Test  Flexible Sigmoidoscopy Age: 45-75 years old   Colonoscopy: every 10 years (May be performed more frequently if at higher risk)  OR  FOBT/FIT: every 1 year  OR  Cologuard: every 3 years  OR  Sigmoidoscopy: every 5 years  Screening may be recommended earlier than age 45 if at higher risk for colorectal cancer. Also, an individualized decision between you and your healthcare provider will decide whether screening between the ages of 76-85 would be appropriate. Colonoscopy: 06/21/2021  FOBT/FIT: Not on file  Cologuard: Not on file  Sigmoidoscopy: Not on file    Screening Current     Prostate Cancer Screening Individualized decision between patient and health care provider in men between ages of 55-69   Medicare will cover every 12 months beginning on the day after your 50th birthday PSA: No results in last 5 years           Hepatitis C Screening Once for adults born between 1945 and 1965  More frequently in patients at high risk for Hepatitis C Hep C Antibody: 06/15/2021    Screening Current   Diabetes Screening 1-2 times per year if you're at risk for diabetes or have pre-diabetes Fasting glucose: No results in last 5 years (No results in last 5 years)  A1C: 5.5 % (6/15/2021)      Cholesterol  Screening Once every 5 years if you don't have a lipid disorder. May order more often based on risk factors. Lipid panel: 02/22/2023  Screening Current      Other Preventive Screenings Covered by Medicare:  Abdominal Aortic Aneurysm (AAA) Screening: covered once if your at risk. You're considered to be at risk if you have a family history of AAA or a male between the age of 65-75 who smoking at least 100 cigarettes in your lifetime.  Lung Cancer Screening: covers low dose CT scan once per year if you meet all of the following conditions: (1) Age 55-77; (2) No signs or symptoms of lung cancer; (3) Current smoker or have quit smoking within the last 15 years; (4) You have a tobacco smoking history of at least 20 pack years (packs per day x number of years you smoked); (5) You get a written order from a healthcare provider.  Glaucoma Screening: covered annually if you're considered high risk: (1) You have diabetes OR (2) Family history of glaucoma OR (3)  aged 50 and older OR (4)  American aged 65 and older  Osteoporosis Screening: covered every 2 years if you meet one of the following conditions: (1) Have a vertebral abnormality; (2) On glucocorticoid therapy for more than 3 months; (3) Have primary hyperparathyroidism; (4) On osteoporosis medications and need to assess response to drug therapy.  HIV Screening: covered annually if you're between the age of 15-65. Also covered annually if you are younger than 15 and older than 65 with risk factors for HIV infection. For pregnant patients, it is covered up to 3 times per pregnancy.    Immunizations:  Immunization Recommendations   Influenza Vaccine Annual influenza vaccination during flu season is recommended for all persons aged >= 6 months who do not have contraindications   Pneumococcal Vaccine   * Pneumococcal conjugate vaccine = PCV13 (Prevnar 13), PCV15 (Vaxneuvance), PCV20 (Prevnar 20)  * Pneumococcal polysaccharide vaccine = PPSV23  (Pneumovax) Adults 19-65 yo with certain risk factors or if 65+ yo  If never received any pneumonia vaccine: recommend Prevnar 20 (PCV20)  Give PCV20 if previously received 1 dose of PCV13 or PPSV23   Hepatitis B Vaccine 3 dose series if at intermediate or high risk (ex: diabetes, end stage renal disease, liver disease)   Respiratory syncytial virus (RSV) Vaccine - COVERED BY MEDICARE PART D  * RSVPreF3 (Arexvy) CDC recommends that adults 60 years of age and older may receive a single dose of RSV vaccine using shared clinical decision-making (SCDM)   Tetanus (Td) Vaccine - COST NOT COVERED BY MEDICARE PART B Following completion of primary series, a booster dose should be given every 10 years to maintain immunity against tetanus. Td may also be given as tetanus wound prophylaxis.   Tdap Vaccine - COST NOT COVERED BY MEDICARE PART B Recommended at least once for all adults. For pregnant patients, recommended with each pregnancy.   Shingles Vaccine (Shingrix) - COST NOT COVERED BY MEDICARE PART B  2 shot series recommended in those 19 years and older who have or will have weakened immune systems or those 50 years and older     Health Maintenance Due:      Topic Date Due   • Colorectal Cancer Screening  06/20/2026   • HIV Screening  Completed   • Hepatitis C Screening  Completed     Immunizations Due:      Topic Date Due   • Influenza Vaccine (1) 09/01/2023   • COVID-19 Vaccine (4 - 2023-24 season) 09/01/2023     Advance Directives   What are advance directives?  Advance directives are legal documents that state your wishes and plans for medical care. These plans are made ahead of time in case you lose your ability to make decisions for yourself. Advance directives can apply to any medical decision, such as the treatments you want, and if you want to donate organs.   What are the types of advance directives?  There are many types of advance directives, and each state has rules about how to use them. You may choose a  combination of any of the following:  Living will:  This is a written record of the treatment you want. You can also choose which treatments you do not want, which to limit, and which to stop at a certain time. This includes surgery, medicine, IV fluid, and tube feedings.   Durable power of  for healthcare (DPAHC):  This is a written record that states who you want to make healthcare choices for you when you are unable to make them for yourself. This person, called a proxy, is usually a family member or a friend. You may choose more than 1 proxy.  Do not resuscitate (DNR) order:  A DNR order is used in case your heart stops beating or you stop breathing. It is a request not to have certain forms of treatment, such as CPR. A DNR order may be included in other types of advance directives.  Medical directive:  This covers the care that you want if you are in a coma, near death, or unable to make decisions for yourself. You can list the treatments you want for each condition. Treatment may include pain medicine, surgery, blood transfusions, dialysis, IV or tube feedings, and a ventilator (breathing machine).  Values history:  This document has questions about your views, beliefs, and how you feel and think about life. This information can help others choose the care that you would choose.  Why are advance directives important?  An advance directive helps you control your care. Although spoken wishes may be used, it is better to have your wishes written down. Spoken wishes can be misunderstood, or not followed. Treatments may be given even if you do not want them. An advance directive may make it easier for your family to make difficult choices about your care.   Weight Management   Why it is important to manage your weight:  Being overweight increases your risk of health conditions such as heart disease, high blood pressure, type 2 diabetes, and certain types of cancer. It can also increase your risk for  osteoarthritis, sleep apnea, and other respiratory problems. Aim for a slow, steady weight loss. Even a small amount of weight loss can lower your risk of health problems.  How to lose weight safely:  A safe and healthy way to lose weight is to eat fewer calories and get regular exercise. You can lose up about 1 pound a week by decreasing the number of calories you eat by 500 calories each day.   Healthy meal plan for weight management:  A healthy meal plan includes a variety of foods, contains fewer calories, and helps you stay healthy. A healthy meal plan includes the following:  Eat whole-grain foods more often.  A healthy meal plan should contain fiber. Fiber is the part of grains, fruits, and vegetables that is not broken down by your body. Whole-grain foods are healthy and provide extra fiber in your diet. Some examples of whole-grain foods are whole-wheat breads and pastas, oatmeal, brown rice, and bulgur.  Eat a variety of vegetables every day.  Include dark, leafy greens such as spinach, kale, harman greens, and mustard greens. Eat yellow and orange vegetables such as carrots, sweet potatoes, and winter squash.   Eat a variety of fruits every day.  Choose fresh or canned fruit (canned in its own juice or light syrup) instead of juice. Fruit juice has very little or no fiber.  Eat low-fat dairy foods.  Drink fat-free (skim) milk or 1% milk. Eat fat-free yogurt and low-fat cottage cheese. Try low-fat cheeses such as mozzarella and other reduced-fat cheeses.  Choose meat and other protein foods that are low in fat.  Choose beans or other legumes such as split peas or lentils. Choose fish, skinless poultry (chicken or turkey), or lean cuts of red meat (beef or pork). Before you cook meat or poultry, cut off any visible fat.   Use less fat and oil.  Try baking foods instead of frying them. Add less fat, such as margarine, sour cream, regular salad dressing and mayonnaise to foods. Eat fewer high-fat foods. Some  examples of high-fat foods include french fries, doughnuts, ice cream, and cakes.  Eat fewer sweets.  Limit foods and drinks that are high in sugar. This includes candy, cookies, regular soda, and sweetened drinks.  Exercise:  Exercise at least 30 minutes per day on most days of the week. Some examples of exercise include walking, biking, dancing, and swimming. You can also fit in more physical activity by taking the stairs instead of the elevator or parking farther away from stores. Ask your healthcare provider about the best exercise plan for you.      © Copyright [employer] 2018 Information is for End User's use only and may not be sold, redistributed or otherwise used for commercial purposes. All illustrations and images included in CareNotes® are the copyrighted property of [employer].D.[employer]M., [employer] or [employer]

## 2024-03-21 NOTE — PROGRESS NOTES
Assessment and Plan:     Problem List Items Addressed This Visit       Morbid obesity with BMI of 40.0-44.9, adult (HCC)     Other Visit Diagnoses       Annual physical exam    -  Primary    Screening for cardiovascular condition        Relevant Orders    Comprehensive metabolic panel    Lipid Panel with Direct LDL reflex    Screening for diabetes mellitus        Relevant Orders    Comprehensive metabolic panel    Screening PSA (prostate specific antigen)        Relevant Orders    PSA, Total Screen            Depression Screening and Follow-up Plan: Patient was screened for depression during today's encounter. They screened negative with a PHQ-2 score of 0.      Preventive health issues were discussed with patient, and age appropriate screening tests were ordered as noted in patient's After Visit Summary.  Personalized health advice and appropriate referrals for health education or preventive services given if needed, as noted in patient's After Visit Summary.     History of Present Illness:     Patient presents for a Medicare Wellness Visit    No questions or concerns today    Patient Care Team:  Elsi Noonan DO as PCP - General (Family Medicine)     Review of Systems:     Review of Systems   Constitutional:  Negative for appetite change, fatigue, fever and unexpected weight change.   HENT:  Negative for congestion, dental problem, ear pain, postnasal drip, sore throat and tinnitus.    Eyes:  Negative for pain, discharge and visual disturbance.   Respiratory:  Negative for cough, shortness of breath and wheezing.    Cardiovascular:  Negative for chest pain, palpitations and leg swelling.   Gastrointestinal:  Negative for abdominal pain, constipation, diarrhea, nausea and vomiting.   Endocrine: Negative for cold intolerance and heat intolerance.   Genitourinary:  Negative for difficulty urinating, dysuria, flank pain and urgency.   Musculoskeletal:  Negative for arthralgias, back pain, joint swelling and  myalgias.   Skin:  Negative for rash and wound.   Allergic/Immunologic: Negative for immunocompromised state.   Neurological:  Negative for dizziness, syncope, speech difficulty, weakness and numbness.   Hematological:  Negative for adenopathy. Does not bruise/bleed easily.   Psychiatric/Behavioral:  Negative for confusion, dysphoric mood and sleep disturbance. The patient is not nervous/anxious.         Problem List:     Patient Active Problem List   Diagnosis    Osteoarthritis    MARY on CPAP    Chronic rhinitis    Morbid obesity with BMI of 40.0-44.9, adult (HCC)    Hx of adenomatous colonic polyps    Gastroesophageal reflux disease without esophagitis    Diverticulosis    Hemorrhoids    Heartburn    COVID    Right ankle injury, initial encounter    Chronic idiopathic constipation      Past Medical and Surgical History:     Past Medical History:   Diagnosis Date    Colon polyp     CPAP (continuous positive airway pressure) dependence     MARY (obstructive sleep apnea)     Osteoarthritis      Past Surgical History:   Procedure Laterality Date    COLONOSCOPY      SINUS SURGERY Bilateral 2014      Family History:     Family History   Problem Relation Age of Onset    Asthma Mother     Breast cancer Mother     Cancer Mother     Early death Mother     Thyroid disease Family       Social History:     Social History     Socioeconomic History    Marital status: /Civil Union     Spouse name: None    Number of children: None    Years of education: None    Highest education level: None   Occupational History    None   Tobacco Use    Smoking status: Never    Smokeless tobacco: Never   Vaping Use    Vaping status: Never Used   Substance and Sexual Activity    Alcohol use: No    Drug use: No    Sexual activity: Yes     Partners: Female     Birth control/protection: None   Other Topics Concern    None   Social History Narrative    None     Social Determinants of Health     Financial Resource Strain: Not on file   Food  Insecurity: Patient Declined (3/21/2024)    Hunger Vital Sign     Worried About Running Out of Food in the Last Year: Patient declined     Ran Out of Food in the Last Year: Patient declined   Transportation Needs: Patient Declined (3/21/2024)    PRAPARE - Transportation     Lack of Transportation (Medical): Patient declined     Lack of Transportation (Non-Medical): Patient declined   Physical Activity: Not on file   Stress: Not on file   Social Connections: Not on file   Intimate Partner Violence: Not on file   Housing Stability: Patient Declined (3/21/2024)    Housing Stability Vital Sign     Unable to Pay for Housing in the Last Year: Patient declined     Number of Places Lived in the Last Year: 1     Unstable Housing in the Last Year: Patient declined      Medications and Allergies:     Current Outpatient Medications   Medication Sig Dispense Refill    fluticasone (FLONASE) 50 mcg/act nasal spray       linaCLOtide (Linzess) 72 MCG CAPS Take 72 mcg by mouth in the morning 1/2 hour before breakfast 90 capsule 1    naproxen (Naprosyn) 500 mg tablet Take 1 tablet (500 mg total) by mouth 2 (two) times a day with meals 28 tablet 0    Respiratory Therapy Supplies (CareTouch CPAP & BIPAP Hose) MISC Use      polyethylene glycol-electrolytes (TriLyte) 4000 mL solution Take 4,000 mL by mouth once for 1 dose Take 4000 mL by mouth once for 1 dose. Use as directed (Patient not taking: Reported on 3/21/2024) 4000 mL 0     No current facility-administered medications for this visit.     No Known Allergies   Immunizations:     Immunization History   Administered Date(s) Administered    COVID-19 PFIZER VACCINE 0.3 ML IM 03/25/2021, 04/15/2021, 12/04/2021    INFLUENZA 01/12/2018, 12/20/2021    Influenza Quadrivalent Preservative Free 3 years and older IM 11/13/2015, 12/01/2016    Influenza, recombinant, quadrivalent,injectable, preservative free 10/31/2018, 12/20/2021    Influenza, seasonal, injectable 09/26/2013    Tdap 09/19/2022       Health Maintenance:         Topic Date Due    Colorectal Cancer Screening  06/20/2026    HIV Screening  Completed    Hepatitis C Screening  Completed         Topic Date Due    Influenza Vaccine (1) 09/01/2023    COVID-19 Vaccine (4 - 2023-24 season) 09/01/2023      Medicare Screening Tests and Risk Assessments:     Son is here for his Subsequent Wellness visit. Last Medicare Wellness visit information reviewed, patient interviewed and updates made to the record today.      Health Risk Assessment:   Patient rates overall health as good. Patient feels that their physical health rating is same. Patient is very satisfied with their life. Eyesight was rated as same. Hearing was rated as slightly worse. Patient feels that their emotional and mental health rating is same. Patients states they are never, rarely angry. Patient states they are never, rarely unusually tired/fatigued. Pain experienced in the last 7 days has been none. Patient states that he has experienced no weight loss or gain in last 6 months.     Depression Screening:   PHQ-2 Score: 0  PHQ-9 Score: 0      Fall Risk Screening:   In the past year, patient has experienced: no history of falling in past year      Home Safety:  Patient does not have trouble with stairs inside or outside of their home. Patient has working smoke alarms and has working carbon monoxide detector. Home safety hazards include: none.     Nutrition:   Current diet is Unhealthy.     Medications:   Patient is not currently taking any over-the-counter supplements. Patient is able to manage medications.     Activities of Daily Living (ADLs)/Instrumental Activities of Daily Living (IADLs):   Walk and transfer into and out of bed and chair?: Yes  Dress and groom yourself?: Yes    Bathe or shower yourself?: Yes    Feed yourself? Yes  Do your laundry/housekeeping?: Yes  Manage your money, pay your bills and track your expenses?: Yes  Make your own meals?: Yes    Do your own shopping?:  Yes    Previous Hospitalizations:   Any hospitalizations or ED visits within the last 12 months?: No      Advance Care Planning:   Living will: Yes    Durable POA for healthcare: Yes    Advanced directive: Yes      Cognitive Screening:   Provider or family/friend/caregiver concerned regarding cognition?: No    PREVENTIVE SCREENINGS      Cardiovascular Screening:    General: Screening Current      Diabetes Screening:     General: Risks and Benefits Discussed    Due for: Blood Glucose      Colorectal Cancer Screening:     General: Screening Current      Prostate Cancer Screening:    General: Risks and Benefits Discussed    Due for: PSA      Osteoporosis Screening:    General: Screening Not Indicated      Abdominal Aortic Aneurysm (AAA) Screening:        General: Screening Not Indicated      Lung Cancer Screening:     General: Screening Not Indicated      Hepatitis C Screening:    General: Screening Current    Screening, Brief Intervention, and Referral to Treatment (SBIRT)    Screening  Typical number of drinks in a day: 0  Typical number of drinks in a week: 0  Interpretation: Low risk drinking behavior.    AUDIT-C Screenin) How often did you have a drink containing alcohol in the past year? monthly or less  2) How many drinks did you have on a typical day when you were drinking in the past year? 1 to 2  3) How often did you have 6 or more drinks on one occasion in the past year? never    AUDIT-C Score: 1  Interpretation: Score 0-3 (male): Negative screen for alcohol misuse    Single Item Drug Screening:  How often have you used an illegal drug (including marijuana) or a prescription medication for non-medical reasons in the past year? never    Single Item Drug Screen Score: 0  Interpretation: Negative screen for possible drug use disorder    Brief Intervention  Alcohol & drug use screenings were reviewed. No concerns regarding substance use disorder identified.     Other Counseling Topics:   Car/seat  "belt/driving safety, skin self-exam, sunscreen and calcium and vitamin D intake and regular weightbearing exercise.     No results found.     Physical Exam:     /80   Pulse 64   Temp 98.2 °F (36.8 °C)   Resp 18   Ht 5' 9\" (1.753 m)   Wt 130 kg (287 lb 3.2 oz)   SpO2 96%   BMI 42.41 kg/m²     Physical Exam  Vitals and nursing note reviewed.   Constitutional:       General: He is not in acute distress.     Appearance: Normal appearance. He is well-developed. He is not ill-appearing.      Comments: Body mass index is 42.41 kg/m².    HENT:      Head: Normocephalic and atraumatic.      Right Ear: Hearing, tympanic membrane, ear canal and external ear normal.      Left Ear: Hearing, tympanic membrane, ear canal and external ear normal.      Nose: Nose normal.      Mouth/Throat:      Pharynx: Uvula midline.   Eyes:      General: No scleral icterus.     Conjunctiva/sclera: Conjunctivae normal.      Pupils: Pupils are equal, round, and reactive to light.   Neck:      Thyroid: No thyromegaly.      Vascular: No JVD.   Cardiovascular:      Rate and Rhythm: Normal rate and regular rhythm.      Heart sounds: Normal heart sounds. No murmur heard.  Pulmonary:      Effort: Pulmonary effort is normal. No respiratory distress.      Breath sounds: Normal breath sounds.   Abdominal:      General: Bowel sounds are normal. There is no distension.      Palpations: Abdomen is soft. There is no mass.      Tenderness: There is no abdominal tenderness.   Musculoskeletal:         General: No tenderness. Normal range of motion.      Cervical back: Normal range of motion and neck supple.      Right lower leg: No edema.      Left lower leg: No edema.   Lymphadenopathy:      Cervical: No cervical adenopathy.   Skin:     General: Skin is warm and dry.      Capillary Refill: Capillary refill takes less than 2 seconds.      Findings: No rash.   Neurological:      General: No focal deficit present.      Mental Status: He is alert and " oriented to person, place, and time.      Cranial Nerves: No cranial nerve deficit.   Psychiatric:         Mood and Affect: Mood normal.         Behavior: Behavior normal.          Leilani Mcghee, DO

## 2024-03-22 ENCOUNTER — TELEPHONE (OUTPATIENT)
Dept: GASTROENTEROLOGY | Facility: CLINIC | Age: 64
End: 2024-03-22

## 2024-03-22 NOTE — TELEPHONE ENCOUNTER
lmom confirming pt's colonoscopy scheduled on 3/26/24 at [facility] with Dr Cooper.  Informed [facility] would be calling the day prior with the arrival time.  Informed of clear liquid diet day prior as well as the bowel cleansing preparation.  Informed would need a  the day of the procedure due to being under sedation. I asked pt to please call back if has not received instructions or if has any questions.

## 2024-03-26 ENCOUNTER — ANESTHESIA EVENT (OUTPATIENT)
Dept: GASTROENTEROLOGY | Facility: AMBULARY SURGERY CENTER | Age: 64
End: 2024-03-26

## 2024-03-26 ENCOUNTER — ANESTHESIA (OUTPATIENT)
Dept: GASTROENTEROLOGY | Facility: AMBULARY SURGERY CENTER | Age: 64
End: 2024-03-26

## 2024-03-26 ENCOUNTER — HOSPITAL ENCOUNTER (OUTPATIENT)
Dept: GASTROENTEROLOGY | Facility: AMBULARY SURGERY CENTER | Age: 64
Setting detail: OUTPATIENT SURGERY
Discharge: HOME | End: 2024-03-26
Attending: INTERNAL MEDICINE
Payer: COMMERCIAL

## 2024-03-26 VITALS
RESPIRATION RATE: 18 BRPM | WEIGHT: 284.39 LBS | BODY MASS INDEX: 42.12 KG/M2 | HEART RATE: 65 BPM | SYSTOLIC BLOOD PRESSURE: 117 MMHG | HEIGHT: 69 IN | OXYGEN SATURATION: 98 % | DIASTOLIC BLOOD PRESSURE: 72 MMHG | TEMPERATURE: 97.9 F

## 2024-03-26 DIAGNOSIS — R19.4 CHANGE IN BOWEL HABIT: ICD-10-CM

## 2024-03-26 PROCEDURE — 88305 TISSUE EXAM BY PATHOLOGIST: CPT | Performed by: PATHOLOGY

## 2024-03-26 PROCEDURE — 45385 COLONOSCOPY W/LESION REMOVAL: CPT | Performed by: INTERNAL MEDICINE

## 2024-03-26 RX ORDER — SODIUM CHLORIDE, SODIUM LACTATE, POTASSIUM CHLORIDE, CALCIUM CHLORIDE 600; 310; 30; 20 MG/100ML; MG/100ML; MG/100ML; MG/100ML
125 INJECTION, SOLUTION INTRAVENOUS CONTINUOUS
Status: DISCONTINUED | OUTPATIENT
Start: 2024-03-26 | End: 2024-03-30 | Stop reason: HOSPADM

## 2024-03-26 RX ORDER — LIDOCAINE HYDROCHLORIDE 10 MG/ML
INJECTION, SOLUTION EPIDURAL; INFILTRATION; INTRACAUDAL; PERINEURAL AS NEEDED
Status: DISCONTINUED | OUTPATIENT
Start: 2024-03-26 | End: 2024-03-26

## 2024-03-26 RX ORDER — PROPOFOL 10 MG/ML
INJECTION, EMULSION INTRAVENOUS AS NEEDED
Status: DISCONTINUED | OUTPATIENT
Start: 2024-03-26 | End: 2024-03-26

## 2024-03-26 RX ORDER — PROPOFOL 10 MG/ML
INJECTION, EMULSION INTRAVENOUS CONTINUOUS PRN
Status: DISCONTINUED | OUTPATIENT
Start: 2024-03-26 | End: 2024-03-26

## 2024-03-26 RX ORDER — ONDANSETRON 2 MG/ML
4 INJECTION INTRAMUSCULAR; INTRAVENOUS ONCE AS NEEDED
Status: CANCELLED | OUTPATIENT
Start: 2024-03-26

## 2024-03-26 RX ORDER — SODIUM CHLORIDE, SODIUM LACTATE, POTASSIUM CHLORIDE, CALCIUM CHLORIDE 600; 310; 30; 20 MG/100ML; MG/100ML; MG/100ML; MG/100ML
INJECTION, SOLUTION INTRAVENOUS CONTINUOUS PRN
Status: DISCONTINUED | OUTPATIENT
Start: 2024-03-26 | End: 2024-03-26

## 2024-03-26 RX ADMIN — SODIUM CHLORIDE, SODIUM LACTATE, POTASSIUM CHLORIDE, AND CALCIUM CHLORIDE: .6; .31; .03; .02 INJECTION, SOLUTION INTRAVENOUS at 11:05

## 2024-03-26 RX ADMIN — LIDOCAINE HYDROCHLORIDE 50 MG: 10 INJECTION, SOLUTION EPIDURAL; INFILTRATION; INTRACAUDAL; PERINEURAL at 11:11

## 2024-03-26 RX ADMIN — SODIUM CHLORIDE, SODIUM LACTATE, POTASSIUM CHLORIDE, AND CALCIUM CHLORIDE 125 ML/HR: .6; .31; .03; .02 INJECTION, SOLUTION INTRAVENOUS at 09:34

## 2024-03-26 RX ADMIN — PROPOFOL 100 MG: 10 INJECTION, EMULSION INTRAVENOUS at 11:11

## 2024-03-26 RX ADMIN — PROPOFOL 110 MCG/KG/MIN: 10 INJECTION, EMULSION INTRAVENOUS at 11:11

## 2024-03-26 NOTE — ANESTHESIA PREPROCEDURE EVALUATION
Procedure:  COLONOSCOPY    Relevant Problems   ANESTHESIA (within normal limits)      CARDIO (within normal limits)      GI/HEPATIC   (+) Gastroesophageal reflux disease without esophagitis      MUSCULOSKELETAL   (+) Osteoarthritis      PULMONARY   (+) MARY on CPAP        Physical Exam    Airway    Mallampati score: II  TM Distance: >3 FB  Neck ROM: full     Dental        Cardiovascular  Cardiovascular exam normal    Pulmonary  Pulmonary exam normal     Other Findings        Anesthesia Plan  ASA Score- 3     Anesthesia Type- IV sedation with anesthesia with ASA Monitors.         Additional Monitors:     Airway Plan:            Plan Factors-Exercise tolerance (METS): >4 METS.    Chart reviewed. EKG reviewed.  Existing labs reviewed. Patient summary reviewed.    Patient is not a current smoker.              Induction-     Postoperative Plan-     Informed Consent- Anesthetic plan and risks discussed with patient.  I personally reviewed this patient with the CRNA. Discussed and agreed on the Anesthesia Plan with the CRNA..

## 2024-03-26 NOTE — H&P
"History and Physical -  [facility]  Frank G Ruzicka 63 y.o. male MRN: [id number]                  HPI: Frank G Ruzicka is a 63 y.o. year old male who presents for history of polyps      REVIEW OF SYSTEMS: Per the HPI, and otherwise unremarkable.    Historical Information   Past Medical History:   Diagnosis Date    Colon polyp     CPAP (continuous positive airway pressure) dependence     MARY (obstructive sleep apnea)     Osteoarthritis      Past Surgical History:   Procedure Laterality Date    COLONOSCOPY      SINUS SURGERY Bilateral 2014     Social History   Social History     Substance and Sexual Activity   Alcohol Use No     Social History     Substance and Sexual Activity   Drug Use No     Social History     Tobacco Use   Smoking Status Never   Smokeless Tobacco Never     Family History   Problem Relation Age of Onset    Asthma Mother     Breast cancer Mother     Cancer Mother     Early death Mother     Thyroid disease Family        Meds/Allergies       Current Outpatient Medications:     fluticasone (FLONASE) 50 mcg/act nasal spray    naproxen (Naprosyn) 500 mg tablet    linaCLOtide (Linzess) 72 MCG CAPS    polyethylene glycol-electrolytes (TriLyte) 4000 mL solution    Respiratory Therapy Supplies (CareTouch CPAP & BIPAP Hose) MISC    Current Facility-Administered Medications:     lactated ringers infusion, 125 mL/hr, Intravenous, Continuous, 125 mL/hr at 03/26/24 0934    No Known Allergies    Objective     /69   Pulse 63   Temp 97.9 °F (36.6 °C) (Temporal)   Resp 18   Ht 5' 9\" (1.753 m)   Wt 129 kg (284 lb 6.3 oz)   SpO2 97%   BMI 42.00 kg/m²       PHYSICAL EXAM    Gen: NAD  Head: NCAT  CV: RRR  CHEST: Clear  ABD: soft, NT/ND  EXT: no edema      ASSESSMENT/PLAN:  This is a 63 y.o. year old male here for colonoscopy, and he is stable and optimized for his procedure.        "

## 2024-03-26 NOTE — ANESTHESIA POSTPROCEDURE EVALUATION
Post-Op Assessment Note    CV Status:  Stable  Pain Score: 0    Pain management: adequate       Mental Status:  Awake   Hydration Status:  Stable   PONV Controlled:  None   Airway Patency:  Patent  Two or more mitigation strategies used for obstructive sleep apnea   Post Op Vitals Reviewed: Yes    No anethesia notable event occurred.    Staff: CRNA   Comments: spontaneously breathing, protecting airway, simple mask to O2, vss, fully endorsed to recovery w/o AC              BP   125/65   Temp      Pulse  72   Resp   14   SpO2   99

## 2024-03-28 PROCEDURE — 88305 TISSUE EXAM BY PATHOLOGIST: CPT | Performed by: PATHOLOGY

## 2024-03-28 NOTE — RESULT ENCOUNTER NOTE
Please call the patient regarding his abnormal result. Has polyp, Follow up colonscopy in 7 years  Follow up in my office as needed

## 2024-04-03 LAB
ALBUMIN SERPL-MCNC: 3.9 G/DL (ref 3.9–4.9)
ALBUMIN/GLOB SERPL: 1.6 {RATIO} (ref 1.2–2.2)
ALP SERPL-CCNC: 107 IU/L (ref 44–121)
ALT SERPL-CCNC: 24 IU/L (ref 0–44)
AST SERPL-CCNC: 21 IU/L (ref 0–40)
BILIRUB SERPL-MCNC: 0.5 MG/DL (ref 0–1.2)
BUN SERPL-MCNC: 14 MG/DL (ref 8–27)
BUN/CREAT SERPL: 15 (ref 10–24)
CALCIUM SERPL-MCNC: 9.6 MG/DL (ref 8.6–10.2)
CHLORIDE SERPL-SCNC: 103 MMOL/L (ref 96–106)
CHOLEST SERPL-MCNC: 178 MG/DL (ref 100–199)
CO2 SERPL-SCNC: 24 MMOL/L (ref 20–29)
CREAT SERPL-MCNC: 0.95 MG/DL (ref 0.76–1.27)
EGFR: 90 ML/MIN/1.73
GLOBULIN SER-MCNC: 2.5 G/DL (ref 1.5–4.5)
GLUCOSE SERPL-MCNC: 85 MG/DL (ref 70–99)
HDLC SERPL-MCNC: 44 MG/DL
LDLC SERPL CALC-MCNC: 113 MG/DL (ref 0–99)
POTASSIUM SERPL-SCNC: 4.7 MMOL/L (ref 3.5–5.2)
PROT SERPL-MCNC: 6.4 G/DL (ref 6–8.5)
PSA SERPL-MCNC: 4.3 NG/ML (ref 0–4)
SODIUM SERPL-SCNC: 140 MMOL/L (ref 134–144)
TRIGL SERPL-MCNC: 116 MG/DL (ref 0–149)

## 2024-04-05 DIAGNOSIS — R97.20 ELEVATED PSA: Primary | ICD-10-CM

## 2024-06-05 DIAGNOSIS — K59.04 CHRONIC IDIOPATHIC CONSTIPATION: ICD-10-CM

## 2024-06-05 RX ORDER — LINACLOTIDE 72 UG/1
72 CAPSULE, GELATIN COATED ORAL DAILY
Qty: 90 CAPSULE | Refills: 1 | Status: SHIPPED | OUTPATIENT
Start: 2024-06-05

## 2024-06-05 NOTE — TELEPHONE ENCOUNTER
Reason for call:   [x] Refill   [] Prior Auth  [x] Other: immanuel from [facility] has pt request to fill meds with them please send to [facility]. Unsure how much pt has     Office:   [] PCP/Provider -   [x] Specialty/Provider - [facility]     Medication: Linzess     Dose/Frequency: 72mcg - daily     Quantity: 90    Pharmacy: [facility]     Does the patient have enough for 3 days?   [] Yes   [] No - Send as HP to POD

## 2024-09-16 ENCOUNTER — RA CDI HCC (OUTPATIENT)
Dept: OTHER | Facility: HOSPITAL | Age: 64
End: 2024-09-16

## 2024-09-16 NOTE — PROGRESS NOTES
HCC coding opportunities       Chart reviewed, no opportunity found: CHART REVIEWED, NO OPPORTUNITY FOUND        Patients Insurance        Commercial Insurance: [employer]

## 2024-09-23 ENCOUNTER — OFFICE VISIT (OUTPATIENT)
Age: 64
End: 2024-09-23
Payer: COMMERCIAL

## 2024-09-23 VITALS
HEART RATE: 62 BPM | BODY MASS INDEX: 42.21 KG/M2 | RESPIRATION RATE: 18 BRPM | HEIGHT: 69 IN | OXYGEN SATURATION: 98 % | WEIGHT: 285 LBS | TEMPERATURE: 98 F | SYSTOLIC BLOOD PRESSURE: 142 MMHG | DIASTOLIC BLOOD PRESSURE: 66 MMHG

## 2024-09-23 DIAGNOSIS — R41.3 POOR SHORT TERM MEMORY: ICD-10-CM

## 2024-09-23 DIAGNOSIS — M67.912 DYSFUNCTION OF LEFT ROTATOR CUFF: ICD-10-CM

## 2024-09-23 DIAGNOSIS — M79.622 LEFT UPPER ARM PAIN: Primary | ICD-10-CM

## 2024-09-23 PROCEDURE — 99213 OFFICE O/P EST LOW 20 MIN: CPT

## 2024-09-23 RX ORDER — NAPROXEN 500 MG/1
500 TABLET ORAL 2 TIMES DAILY WITH MEALS
Qty: 28 TABLET | Refills: 0 | Status: SHIPPED | OUTPATIENT
Start: 2024-09-23

## 2024-09-23 NOTE — ASSESSMENT & PLAN NOTE
Orders:    naproxen (Naprosyn) 500 mg tablet; Take 1 tablet (500 mg total) by mouth 2 (two) times a day with meals  See above

## 2024-09-23 NOTE — PROGRESS NOTES
Ambulatory Visit  Name: Frank G Ruzicka      : 1960      MRN: [id number]  Encounter Provider: Elsi Noonan DO  Encounter Date: 2024   Encounter department: [facility]    Assessment & Plan  Left upper arm pain  Positive empty can test, positive Chan-Rajat test, positive neer test, and tenderness to palpation of upper bicep and shoulder joint   - Suspect rotator cuff injury, such as supraspinatus tear, vs rotator cuff impingement vs biceps tendinitis   - Ordered shoulder xray to assess for any fractures   - Provided referral to orthopedic surgery for further evaluation and management   - Prescribed naproxen for 2 weeks for pain   - Advised to use ice/heat as needed   Orders:    XR shoulder 2+ vw left; Future    Ambulatory Referral to Orthopedic Surgery; Future    Dysfunction of left rotator cuff    Orders:    naproxen (Naprosyn) 500 mg tablet; Take 1 tablet (500 mg total) by mouth 2 (two) times a day with meals  See above  Poor short term memory    Orders:    Vitamin B12; Future    CBC and differential; Future    Comprehensive metabolic panel; Future    Vitamin B12    CBC and differential    Comprehensive metabolic panel  Ordered Vitamin B12, CBC, and CMP to rule out any vitamin or metabolic abnormalities as etiology for short term memory   - Advised patient to return in 1-2 weeks for in office testing of short term memory (MOCA)   - Possible mild cognitive impairment, will test in office then if indicated can order imaging or refer to neuropsychiatry for further testing   - Will screen for depression at upcoming visit as well      History of Present Illness     HPI  Patient is a 64-year-old male with past medical history of GERD, MARY, osteoarthritis that presents to the clinic for left upper arm pain.  Patient reports that this pain started about 1 month ago and is located in his mid to upper bicep region.  He describes this as a constant, dull pain/soreness.  He  states that using the muscle does not make it worse but it hurts worse when he goes to sleep.  He mostly sleeps on his back.  He does report weight lifting every morning but has not been using that arm as much due to the discomfort.  He reports he has been taking his wife naproxen at home for the past 2 to 3 weeks, which has been helping.    Patient also reports that his short-term memory has worsened in the last several years.  He states he had a skull fracture when he was in the  many years ago.  He would like to get further testing for this.    History obtained from : patient  Review of Systems   Constitutional:  Negative for chills and fever.   HENT:  Negative for ear pain and sore throat.    Eyes:  Negative for pain and visual disturbance.   Respiratory:  Negative for cough and shortness of breath.    Cardiovascular:  Negative for chest pain and palpitations.   Gastrointestinal:  Negative for abdominal pain and vomiting.   Genitourinary:  Negative for dysuria and hematuria.   Musculoskeletal:  Negative for back pain.        Left shoulder/upper arm pain   Skin:  Negative for color change and rash.   Neurological:  Negative for seizures and syncope.        Decreased short term memory    Psychiatric/Behavioral:  Negative for confusion and decreased concentration.    All other systems reviewed and are negative.    Current Outpatient Medications on File Prior to Visit   Medication Sig Dispense Refill    fluticasone (FLONASE) 50 mcg/act nasal spray       linaCLOtide (Linzess) 72 MCG CAPS Take 72 mcg by mouth in the morning 1/2 hour before breakfast 90 capsule 1    Respiratory Therapy Supplies (CareTouch CPAP & BIPAP Hose) MISC Use      [DISCONTINUED] naproxen (Naprosyn) 500 mg tablet Take 1 tablet (500 mg total) by mouth 2 (two) times a day with meals 28 tablet 0    polyethylene glycol-electrolytes (TriLyte) 4000 mL solution Take 4,000 mL by mouth once for 1 dose Take 4000 mL by mouth once for 1 dose. Use as  "directed (Patient not taking: Reported on 3/21/2024) 4000 mL 0     No current facility-administered medications on file prior to visit.      Social History     Tobacco Use    Smoking status: Never    Smokeless tobacco: Never   Vaping Use    Vaping status: Never Used   Substance and Sexual Activity    Alcohol use: No    Drug use: No    Sexual activity: Yes     Partners: Female     Birth control/protection: None         Objective     /66   Pulse 62   Temp 98 °F (36.7 °C)   Resp 18   Ht 5' 9\" (1.753 m)   Wt 129 kg (285 lb)   SpO2 98%   BMI 42.09 kg/m²     Physical Exam  Vitals reviewed.   Constitutional:       General: He is not in acute distress.     Appearance: Normal appearance. He is well-developed.      Comments: Body mass index is 42.09 kg/m².    HENT:      Head: Normocephalic and atraumatic.      Right Ear: External ear normal.      Left Ear: External ear normal.      Nose: Nose normal.      Mouth/Throat:      Pharynx: Oropharynx is clear.   Eyes:      Extraocular Movements: Extraocular movements intact.      Conjunctiva/sclera: Conjunctivae normal.      Pupils: Pupils are equal, round, and reactive to light.   Cardiovascular:      Rate and Rhythm: Normal rate and regular rhythm.      Pulses: Normal pulses.      Heart sounds: Normal heart sounds. No murmur heard.  Pulmonary:      Effort: Pulmonary effort is normal. No respiratory distress.      Breath sounds: Normal breath sounds.   Abdominal:      General: Abdomen is flat.      Palpations: Abdomen is soft.      Tenderness: There is no abdominal tenderness.   Musculoskeletal:         General: Swelling and tenderness present.      Cervical back: Normal range of motion and neck supple.      Right lower leg: No edema.      Left lower leg: No edema.      Comments: Positive empty can test, positive neer test, positive padilla test, pain to palpation of upper bicep and shoulder joint; negative drop arm test; exhibits full range of motion, has muscle " weakness of left arm compared to right    Skin:     General: Skin is warm and dry.      Capillary Refill: Capillary refill takes less than 2 seconds.   Neurological:      Mental Status: He is alert and oriented to person, place, and time. Mental status is at baseline.   Psychiatric:         Mood and Affect: Mood normal.         Behavior: Behavior normal.

## 2024-09-24 ENCOUNTER — HOSPITAL ENCOUNTER (OUTPATIENT)
Dept: RADIOLOGY | Facility: HOSPITAL | Age: 64
Discharge: HOME | End: 2024-09-24
Payer: COMMERCIAL

## 2024-09-24 DIAGNOSIS — M79.622 LEFT UPPER ARM PAIN: ICD-10-CM

## 2024-09-24 PROCEDURE — 73030 X-RAY EXAM OF SHOULDER: CPT

## 2024-09-27 NOTE — RESULT ENCOUNTER NOTE
Called patient and informed of results. Patient has an apt with orthopedic surgery on 10/2/24 for further evaluation and management. Patient will make an appointment with our office in regard to cognitive testing for short term memory loss. All questions answered.

## 2024-09-27 NOTE — RESULT ENCOUNTER NOTE
X-ray without sign of fracture. Did show arthritis and potential tendinitis. Patient should follow up with Dr. Rios as planned on 10/2/2024. May continue naproxen as ordered on 9/23/2024. [employer] message sent to patient.

## 2024-10-02 ENCOUNTER — OFFICE VISIT (OUTPATIENT)
Dept: OBGYN CLINIC | Facility: CLINIC | Age: 64
End: 2024-10-02
Payer: COMMERCIAL

## 2024-10-02 VITALS — BODY MASS INDEX: 42.21 KG/M2 | HEIGHT: 69 IN | WEIGHT: 285 LBS

## 2024-10-02 DIAGNOSIS — M79.622 LEFT UPPER ARM PAIN: ICD-10-CM

## 2024-10-02 PROCEDURE — 99203 OFFICE O/P NEW LOW 30 MIN: CPT | Performed by: STUDENT IN AN ORGANIZED HEALTH CARE EDUCATION/TRAINING PROGRAM

## 2024-10-02 NOTE — PROGRESS NOTES
Ortho Sports Medicine Shoulder New Patient Visit     Assesment:   64 y.o. male left shoulder primary osteoarthritis    Plan:    Presents today for initial evaluation of left shoulder pain.  After review of his x-rays and history, and a thorough physical exam, I believe his primary osteoarthritis is the source of his shoulder pain.  Given that he is primarily experiencing pain but has no weakness, I did offer him a cortisone injection, which Son denied at this time.  I also offered a referral to outpatient physical therapy for targeted shoulder strengthening, which the patient also denied at this time.  I did provide him with exercises and Thera-Band's to complete exercises at home.  He can follow-up with me as needed or symptoms worsen.    Conservative treatment:    Ice to shoulder 1-2 times daily, for 20 minutes at a time.  PT for ROM and strengthening to shoulder, rotator cuff, scapular stabilizers.  OTC as needed for pain management   Let pain guide return to activities.      Imaging:    All imaging from today was reviewed by myself and explained to the patient.       Injection:    No Injection planned at this time.      Surgery:     No surgery is recommended at this point, continue with conservative treatment plan as noted.      Follow up:    Return if symptoms worsen or fail to improve.        Chief Complaint   Patient presents with    Left Shoulder - Pain       History of Present Illness:    The patient is a 64 y.o., right hand dominant male whose occupation is retired, seen in clinic for evaluation of left shoulder pain that has been going on for approximately 1 month now.  He denies any specific mechanism of injury.  He states that he has been a very active individual all of his life, and works out to this day.  He states that at most the pain gets to be a 3 out of 10.  He has attempted over-the-counter pain medication, as well as prescription medication such as naproxen which provide minimal relief.  He  has states that he has pain with overhead activity. He denies any numbness tingling at this time.      Shoulder Surgical History:  None    Past Medical, Social and Family History:  Past Medical History:   Diagnosis Date    Colon polyp     CPAP (continuous positive airway pressure) dependence     MARY (obstructive sleep apnea)     Osteoarthritis      Past Surgical History:   Procedure Laterality Date    COLONOSCOPY      SINUS SURGERY Bilateral 2014     No Known Allergies  Current Outpatient Medications on File Prior to Visit   Medication Sig Dispense Refill    fluticasone (FLONASE) 50 mcg/act nasal spray       linaCLOtide (Linzess) 72 MCG CAPS Take 72 mcg by mouth in the morning 1/2 hour before breakfast 90 capsule 1    naproxen (Naprosyn) 500 mg tablet Take 1 tablet (500 mg total) by mouth 2 (two) times a day with meals 28 tablet 0    Respiratory Therapy Supplies (CareTouch CPAP & BIPAP Hose) MISC Use      polyethylene glycol-electrolytes (TriLyte) 4000 mL solution Take 4,000 mL by mouth once for 1 dose Take 4000 mL by mouth once for 1 dose. Use as directed (Patient not taking: Reported on 3/21/2024) 4000 mL 0     No current facility-administered medications on file prior to visit.     Social History     Socioeconomic History    Marital status: /Civil Union     Spouse name: Not on file    Number of children: Not on file    Years of education: Not on file    Highest education level: Not on file   Occupational History    Not on file   Tobacco Use    Smoking status: Never    Smokeless tobacco: Never   Vaping Use    Vaping status: Never Used   Substance and Sexual Activity    Alcohol use: No    Drug use: No    Sexual activity: Yes     Partners: Female     Birth control/protection: None   Other Topics Concern    Not on file   Social History Narrative    Not on file     Social Determinants of Health     Financial Resource Strain: Not on file   Food Insecurity: Patient Declined (3/21/2024)    Hunger Vital Sign      "Worried About Running Out of Food in the Last Year: Patient declined     Ran Out of Food in the Last Year: Patient declined   Transportation Needs: Patient Declined (3/21/2024)    PRAPARE - Transportation     Lack of Transportation (Medical): Patient declined     Lack of Transportation (Non-Medical): Patient declined   Physical Activity: Not on file   Stress: Not on file   Social Connections: Not on file   Intimate Partner Violence: Not on file   Housing Stability: Patient Declined (3/21/2024)    Housing Stability Vital Sign     Unable to Pay for Housing in the Last Year: Patient declined     Number of Times Moved in the Last Year: 1     Homeless in the Last Year: Patient declined         I have reviewed the past medical, surgical, social and family history, medications and allergies as documented in the EMR.    Review of systems: ROS is negative other than that noted in the HPI.  Constitutional: Negative for fatigue and fever.   HENT: Negative for sore throat.    Respiratory: Negative for shortness of breath.    Cardiovascular: Negative for chest pain.   Gastrointestinal: Negative for abdominal pain.   Endocrine: Negative for cold intolerance and heat intolerance.   Genitourinary: Negative for flank pain.   Musculoskeletal: Negative for back pain.   Skin: Negative for rash.   Allergic/Immunologic: Negative for immunocompromised state.   Neurological: Negative for dizziness.   Psychiatric/Behavioral: Negative for agitation.      Physical Exam:    Height 5' 9\" (1.753 m), weight 129 kg (285 lb).    General/Constitutional: NAD, well developed, well nourished  HENT: Normocephalic, atraumatic  CV: Intact distal pulses, regular rate  Resp: No respiratory distress or labored breathing  Lymphatic: No lymphadenopathy palpated  Neuro: Alert and Oriented x 3, no focal deficits  Psych: Normal mood, normal affect, normal judgement, normal behavior  Skin: Warm, dry, no rashes, no erythema      Shoulder focused exam:     Visual " inspection of the shoulder demonstrates normal contour without atrophy  No evidence of scapular dyskinesia or atrophy.  No scapular winging  Active and passive range of motion demonstrates forward flexion to 180, abduction to 120, external rotation is approximately 80 with the arm in 90° of abduction, external rotation is 60 with the arm the side, internal rotation to T12.   Rotator cuff strength is 5/5 to resisted forward flexion, 5/5 resisted abduction, 5/5 resisted external rotation, 5/5 resisted internal rotation  No tenderness to palpation at the distal clavicle, AC joint, acromion, or scapular spine  Pain with cross-body adduction  + Neer's test, - modified Chan impingement test  Negative external rotation lag sign  Negative belly press, negative lift-off  + speed's tests  + tenderness to palpation at the bicipital groove        UE NV Exam: +2 Radial pulses bilaterally  Sensation intact to light touch C5-T1 bilaterally, Radial/median/ulnar nerve motor intact      Bilateral elbow, wrist, and and forearm ROM full, painless with passive ROM, no ttp or crepitance throughout extremities below shoulder joint    Cervical ROM is full without pain, numbness or tingling      Shoulder Imaging    X-rays of the left shoulder were reviewed, which demonstrate severe osteoarthritic changes with osteophyte formation and sclerotic changes to the humeral joint, along with moderate to severe joint space narrowing.  There is some calcification of the supraspinatus tendon footprint.  I have reviewed the radiology report and agree with their impression.    Scribe Attestation      I,:  Giles Seaman am acting as a scribe while in the presence of the attending physician.:       I,:  Ramsey Rios, DO personally performed the services described in this documentation    as scribed in my presence.:

## 2024-10-02 NOTE — LETTER
October 2, 2024     Elsi Noonan, DO  [address] [facility] [address]    Patient: Frank G Ruzicka   YOB: 1960   Date of Visit: 10/2/2024       Dear Dr. Noonan:    Thank you for referring Frank Ruzicka to me for evaluation. Below are my notes for this consultation.    If you have questions, please do not hesitate to call me. I look forward to following your patient along with you.         Sincerely,        Ramsey Rios DO        CC: No Recipients    Ramsey Rios DO  10/2/2024  8:45 AM  Sign when Signing Visit  Ortho Sports Medicine Shoulder New Patient Visit     Assesment:   64 y.o. male left shoulder primary osteoarthritis    Plan:    Presents today for initial evaluation of left shoulder pain.  After review of his x-rays and history, and a thorough physical exam, I believe his primary osteoarthritis is the source of his shoulder pain.  Given that he is primarily experiencing pain but has no weakness, I did offer him a cortisone injection, which Son denied at this time.  I also offered a referral to outpatient physical therapy for targeted shoulder strengthening, which the patient also denied at this time.  I did provide him with exercises and Thera-Band's to complete exercises at home.  He can follow-up with me as needed or symptoms worsen.    Conservative treatment:    Ice to shoulder 1-2 times daily, for 20 minutes at a time.  PT for ROM and strengthening to shoulder, rotator cuff, scapular stabilizers.  OTC as needed for pain management   Let pain guide return to activities.      Imaging:    All imaging from today was reviewed by myself and explained to the patient.       Injection:    No Injection planned at this time.      Surgery:     No surgery is recommended at this point, continue with conservative treatment plan as noted.      Follow up:    Return if symptoms worsen or fail to improve.        Chief Complaint   Patient presents with   • Left Shoulder - Pain        History of Present Illness:    The patient is a 64 y.o., right hand dominant male whose occupation is retired, seen in clinic for evaluation of left shoulder pain that has been going on for approximately 1 month now.  He denies any specific mechanism of injury.  He states that he has been a very active individual all of his life, and works out to this day.  He states that at most the pain gets to be a 3 out of 10.  He has attempted over-the-counter pain medication, as well as prescription medication such as naproxen which provide minimal relief.  He has states that he has pain with overhead activity. He denies any numbness tingling at this time.      Shoulder Surgical History:  None    Past Medical, Social and Family History:  Past Medical History:   Diagnosis Date   • Colon polyp    • CPAP (continuous positive airway pressure) dependence    • MARY (obstructive sleep apnea)    • Osteoarthritis      Past Surgical History:   Procedure Laterality Date   • COLONOSCOPY     • SINUS SURGERY Bilateral 2014     No Known Allergies  Current Outpatient Medications on File Prior to Visit   Medication Sig Dispense Refill   • fluticasone (FLONASE) 50 mcg/act nasal spray      • linaCLOtide (Linzess) 72 MCG CAPS Take 72 mcg by mouth in the morning 1/2 hour before breakfast 90 capsule 1   • naproxen (Naprosyn) 500 mg tablet Take 1 tablet (500 mg total) by mouth 2 (two) times a day with meals 28 tablet 0   • Respiratory Therapy Supplies (CareTouch CPAP & BIPAP Hose) MISC Use     • polyethylene glycol-electrolytes (TriLyte) 4000 mL solution Take 4,000 mL by mouth once for 1 dose Take 4000 mL by mouth once for 1 dose. Use as directed (Patient not taking: Reported on 3/21/2024) 4000 mL 0     No current facility-administered medications on file prior to visit.     Social History     Socioeconomic History   • Marital status: /Civil Union     Spouse name: Not on file   • Number of children: Not on file   • Years of education:  Not on file   • Highest education level: Not on file   Occupational History   • Not on file   Tobacco Use   • Smoking status: Never   • Smokeless tobacco: Never   Vaping Use   • Vaping status: Never Used   Substance and Sexual Activity   • Alcohol use: No   • Drug use: No   • Sexual activity: Yes     Partners: Female     Birth control/protection: None   Other Topics Concern   • Not on file   Social History Narrative   • Not on file     Social Determinants of Health     Financial Resource Strain: Not on file   Food Insecurity: Patient Declined (3/21/2024)    Hunger Vital Sign    • Worried About Running Out of Food in the Last Year: Patient declined    • Ran Out of Food in the Last Year: Patient declined   Transportation Needs: Patient Declined (3/21/2024)    PRAPARE - Transportation    • Lack of Transportation (Medical): Patient declined    • Lack of Transportation (Non-Medical): Patient declined   Physical Activity: Not on file   Stress: Not on file   Social Connections: Not on file   Intimate Partner Violence: Not on file   Housing Stability: Patient Declined (3/21/2024)    Housing Stability Vital Sign    • Unable to Pay for Housing in the Last Year: Patient declined    • Number of Times Moved in the Last Year: 1    • Homeless in the Last Year: Patient declined         I have reviewed the past medical, surgical, social and family history, medications and allergies as documented in the EMR.    Review of systems: ROS is negative other than that noted in the HPI.  Constitutional: Negative for fatigue and fever.   HENT: Negative for sore throat.    Respiratory: Negative for shortness of breath.    Cardiovascular: Negative for chest pain.   Gastrointestinal: Negative for abdominal pain.   Endocrine: Negative for cold intolerance and heat intolerance.   Genitourinary: Negative for flank pain.   Musculoskeletal: Negative for back pain.   Skin: Negative for rash.   Allergic/Immunologic: Negative for immunocompromised  "state.   Neurological: Negative for dizziness.   Psychiatric/Behavioral: Negative for agitation.      Physical Exam:    Height 5' 9\" (1.753 m), weight 129 kg (285 lb).    General/Constitutional: NAD, well developed, well nourished  HENT: Normocephalic, atraumatic  CV: Intact distal pulses, regular rate  Resp: No respiratory distress or labored breathing  Lymphatic: No lymphadenopathy palpated  Neuro: Alert and Oriented x 3, no focal deficits  Psych: Normal mood, normal affect, normal judgement, normal behavior  Skin: Warm, dry, no rashes, no erythema      Shoulder focused exam:     Visual inspection of the shoulder demonstrates normal contour without atrophy  No evidence of scapular dyskinesia or atrophy.  No scapular winging  Active and passive range of motion demonstrates forward flexion to 180, abduction to 120, external rotation is approximately 80 with the arm in 90° of abduction, external rotation is 60 with the arm the side, internal rotation to T12.   Rotator cuff strength is 5/5 to resisted forward flexion, 5/5 resisted abduction, 5/5 resisted external rotation, 5/5 resisted internal rotation  No tenderness to palpation at the distal clavicle, AC joint, acromion, or scapular spine  Pain with cross-body adduction  + Neer's test, - modified Chan impingement test  Negative external rotation lag sign  Negative belly press, negative lift-off  + speed's tests  + tenderness to palpation at the bicipital groove        UE NV Exam: +2 Radial pulses bilaterally  Sensation intact to light touch C5-T1 bilaterally, Radial/median/ulnar nerve motor intact      Bilateral elbow, wrist, and and forearm ROM full, painless with passive ROM, no ttp or crepitance throughout extremities below shoulder joint    Cervical ROM is full without pain, numbness or tingling      Shoulder Imaging    X-rays of the left shoulder were reviewed, which demonstrate severe osteoarthritic changes with osteophyte formation and sclerotic changes " to the humeral joint, along with moderate to severe joint space narrowing.  There is some calcification of the supraspinatus tendon footprint.  I have reviewed the radiology report and agree with their impression.    Scribe Attestation      I,:  Giles Seaman am acting as a scribe while in the presence of the attending physician.:       I,:  Ramsey Rios, DO personally performed the services described in this documentation    as scribed in my presence.:

## 2024-10-08 ENCOUNTER — OFFICE VISIT (OUTPATIENT)
Age: 64
End: 2024-10-08
Payer: COMMERCIAL

## 2024-10-08 ENCOUNTER — TELEPHONE (OUTPATIENT)
Age: 64
End: 2024-10-08

## 2024-10-08 VITALS
WEIGHT: 290.2 LBS | DIASTOLIC BLOOD PRESSURE: 80 MMHG | HEIGHT: 69 IN | HEART RATE: 62 BPM | SYSTOLIC BLOOD PRESSURE: 142 MMHG | BODY MASS INDEX: 42.98 KG/M2 | OXYGEN SATURATION: 98 % | TEMPERATURE: 98.2 F

## 2024-10-08 DIAGNOSIS — R41.3 POOR SHORT TERM MEMORY: Primary | ICD-10-CM

## 2024-10-08 DIAGNOSIS — M79.622 LEFT UPPER ARM PAIN: ICD-10-CM

## 2024-10-08 PROCEDURE — 99213 OFFICE O/P EST LOW 20 MIN: CPT

## 2024-10-08 RX ORDER — LIDOCAINE 40 MG/G
CREAM TOPICAL AS NEEDED
Qty: 120 G | Refills: 0 | Status: SHIPPED | OUTPATIENT
Start: 2024-10-08 | End: 2024-10-10

## 2024-10-08 NOTE — TELEPHONE ENCOUNTER
Writer spoke to patient in regards to [facility]. Writer let patient know at this time we are not taking NP. Writer offered to send outside resources. Writer closed referral at this time.

## 2024-10-08 NOTE — PROGRESS NOTES
Ambulatory Visit  Name: Frank G Ruzicka      : 1960      MRN: [id number]  Encounter Provider: Elsi Noonan DO  Encounter Date: 10/8/2024   Encounter department: [facility]    Assessment & Plan  Poor short term memory  MOCA testing performed in office today. Patient tested at . Patient still concerned with short term memory and would like full evaluation- provided referral to neuropsych for this.   Orders:    Ambulatory Referral to Neuropsychology; Future    Left upper arm pain  Recent xray showed arthritis in left shoulder   Patient saw orthopedic surgeon who did not recommend any surgery at this time. They offered him shoulder injections, however patient declined.   Continue naproxen until run out of medication, may consider additional month refill if needed however discussed side effects with patient including kidney issues, GI bleeding, and stomach ulcers.   Ordered lidocaine 4% cream to use as needed for shoulder pain. Advised patient he may also use voltaren gel as well.   Orders:    lidocaine (LMX) 4 % cream; Apply topically as needed for mild pain    Follow up in 1 month for recheck shoulder pain and short term memory.      History of Present Illness     HPI  The patient is a 65 yo M with PMHx of MARY on CPAP, GERD, and chronic rhinitis that presents to the office for testing of short term memory. Patient reports that his short term memory has worsened recently and he would like to have testing done. He will often forget where he placed things. He reports he drove to the wrong doctor office this morning. He also states he saw the orthopedic surgeon for his left shoulder pain- he did not want to get any injections. Patient still having the pain however states the naproxen is working for this but does not want to take for a long time given the possible side effects.     History obtained from : patient  Review of Systems   Constitutional:  Negative for chills and fever.  "  HENT:  Negative for ear pain and sore throat.    Eyes:  Negative for pain and visual disturbance.   Respiratory:  Negative for cough and shortness of breath.    Cardiovascular:  Negative for chest pain and palpitations.   Gastrointestinal:  Negative for abdominal pain and vomiting.   Genitourinary:  Negative for dysuria and hematuria.   Musculoskeletal:  Negative for arthralgias and back pain.        Left shoulder pain   Skin:  Negative for color change and rash.   Neurological:  Negative for seizures and syncope.   Psychiatric/Behavioral:          Poor short term memory    All other systems reviewed and are negative.    Current Outpatient Medications on File Prior to Visit   Medication Sig Dispense Refill    fluticasone (FLONASE) 50 mcg/act nasal spray       linaCLOtide (Linzess) 72 MCG CAPS Take 72 mcg by mouth in the morning 1/2 hour before breakfast 90 capsule 1    naproxen (Naprosyn) 500 mg tablet Take 1 tablet (500 mg total) by mouth 2 (two) times a day with meals 28 tablet 0    Respiratory Therapy Supplies (CareTouch CPAP & BIPAP Hose) MISC Use      polyethylene glycol-electrolytes (TriLyte) 4000 mL solution Take 4,000 mL by mouth once for 1 dose Take 4000 mL by mouth once for 1 dose. Use as directed (Patient not taking: Reported on 3/21/2024) 4000 mL 0     No current facility-administered medications on file prior to visit.      Social History     Tobacco Use    Smoking status: Never    Smokeless tobacco: Never   Vaping Use    Vaping status: Never Used   Substance and Sexual Activity    Alcohol use: No    Drug use: No    Sexual activity: Yes     Partners: Female     Birth control/protection: None         Objective     /80   Pulse 62   Temp 98.2 °F (36.8 °C)   Ht 5' 9\" (1.753 m)   Wt 132 kg (290 lb 3.2 oz)   SpO2 98%   BMI 42.86 kg/m²     Physical Exam  Vitals reviewed.   Constitutional:       General: He is not in acute distress.     Appearance: Normal appearance. He is well-developed.   HENT: "      Head: Normocephalic and atraumatic.      Right Ear: External ear normal.      Left Ear: External ear normal.      Nose: Nose normal.      Mouth/Throat:      Pharynx: Oropharynx is clear.   Eyes:      Extraocular Movements: Extraocular movements intact.      Conjunctiva/sclera: Conjunctivae normal.      Pupils: Pupils are equal, round, and reactive to light.   Cardiovascular:      Rate and Rhythm: Normal rate and regular rhythm.      Pulses: Normal pulses.      Heart sounds: Normal heart sounds. No murmur heard.  Pulmonary:      Effort: Pulmonary effort is normal. No respiratory distress.      Breath sounds: Normal breath sounds.   Abdominal:      General: Abdomen is flat.      Palpations: Abdomen is soft.      Tenderness: There is no abdominal tenderness.   Musculoskeletal:         General: No swelling.      Cervical back: Normal range of motion and neck supple.      Right lower leg: No edema.      Left lower leg: No edema.   Skin:     General: Skin is warm and dry.      Capillary Refill: Capillary refill takes less than 2 seconds.   Neurological:      Mental Status: He is alert and oriented to person, place, and time. Mental status is at baseline.   Psychiatric:         Mood and Affect: Mood normal.         Behavior: Behavior normal.

## 2024-10-10 ENCOUNTER — TELEPHONE (OUTPATIENT)
Age: 64
End: 2024-10-10

## 2024-10-10 DIAGNOSIS — R41.3 POOR SHORT TERM MEMORY: Primary | ICD-10-CM

## 2024-10-10 NOTE — TELEPHONE ENCOUNTER
4% cream is no longer made by company. Patient would like new medication to be prescribed     Patient needs a new ref for neuro psychology.

## 2024-10-11 NOTE — TELEPHONE ENCOUNTER
Left patient a detailed voice message regarding message from PCP. Also let Pt. Know to call office back if they had any concerns or questions.

## 2024-11-01 LAB
ALBUMIN SERPL-MCNC: 4.2 G/DL (ref 3.9–4.9)
ALP SERPL-CCNC: 114 IU/L (ref 44–121)
ALT SERPL-CCNC: 23 IU/L (ref 0–44)
AST SERPL-CCNC: 20 IU/L (ref 0–40)
BASOPHILS # BLD AUTO: 0.1 X10E3/UL (ref 0–0.2)
BASOPHILS NFR BLD AUTO: 2 %
BILIRUB SERPL-MCNC: 0.3 MG/DL (ref 0–1.2)
BUN SERPL-MCNC: 14 MG/DL (ref 8–27)
BUN/CREAT SERPL: 14 (ref 10–24)
CALCIUM SERPL-MCNC: 9.8 MG/DL (ref 8.6–10.2)
CHLORIDE SERPL-SCNC: 103 MMOL/L (ref 96–106)
CO2 SERPL-SCNC: 19 MMOL/L (ref 20–29)
CREAT SERPL-MCNC: 1 MG/DL (ref 0.76–1.27)
EGFR: 84 ML/MIN/1.73
EOSINOPHIL # BLD AUTO: 0.3 X10E3/UL (ref 0–0.4)
EOSINOPHIL NFR BLD AUTO: 4 %
ERYTHROCYTE [DISTWIDTH] IN BLOOD BY AUTOMATED COUNT: 12.7 % (ref 11.6–15.4)
GLOBULIN SER-MCNC: 2.6 G/DL (ref 1.5–4.5)
GLUCOSE SERPL-MCNC: 83 MG/DL (ref 70–99)
HCT VFR BLD AUTO: 49.4 % (ref 37.5–51)
HGB BLD-MCNC: 16.2 G/DL (ref 13–17.7)
IMM GRANULOCYTES # BLD: 0 X10E3/UL (ref 0–0.1)
IMM GRANULOCYTES NFR BLD: 0 %
LYMPHOCYTES # BLD AUTO: 2 X10E3/UL (ref 0.7–3.1)
LYMPHOCYTES NFR BLD AUTO: 26 %
MCH RBC QN AUTO: 28.6 PG (ref 26.6–33)
MCHC RBC AUTO-ENTMCNC: 32.8 G/DL (ref 31.5–35.7)
MCV RBC AUTO: 87 FL (ref 79–97)
MONOCYTES # BLD AUTO: 0.6 X10E3/UL (ref 0.1–0.9)
MONOCYTES NFR BLD AUTO: 8 %
NEUTROPHILS # BLD AUTO: 4.5 X10E3/UL (ref 1.4–7)
NEUTROPHILS NFR BLD AUTO: 60 %
PLATELET # BLD AUTO: 393 X10E3/UL (ref 150–450)
POTASSIUM SERPL-SCNC: 4.7 MMOL/L (ref 3.5–5.2)
PROT SERPL-MCNC: 6.8 G/DL (ref 6–8.5)
RBC # BLD AUTO: 5.66 X10E6/UL (ref 4.14–5.8)
SODIUM SERPL-SCNC: 140 MMOL/L (ref 134–144)
VIT B12 SERPL-MCNC: 473 PG/ML (ref 232–1245)
WBC # BLD AUTO: 7.5 X10E3/UL (ref 3.4–10.8)

## 2024-11-05 ENCOUNTER — OFFICE VISIT (OUTPATIENT)
Age: 64
End: 2024-11-05
Payer: COMMERCIAL

## 2024-11-05 VITALS
HEART RATE: 72 BPM | DIASTOLIC BLOOD PRESSURE: 64 MMHG | WEIGHT: 289 LBS | BODY MASS INDEX: 42.68 KG/M2 | TEMPERATURE: 98.2 F | SYSTOLIC BLOOD PRESSURE: 132 MMHG | OXYGEN SATURATION: 97 %

## 2024-11-05 DIAGNOSIS — F41.9 MODERATE ANXIETY: ICD-10-CM

## 2024-11-05 DIAGNOSIS — R41.3 POOR SHORT TERM MEMORY: Primary | ICD-10-CM

## 2024-11-05 DIAGNOSIS — M79.622 LEFT UPPER ARM PAIN: ICD-10-CM

## 2024-11-05 DIAGNOSIS — F32.A MILD DEPRESSION: ICD-10-CM

## 2024-11-05 PROCEDURE — 99213 OFFICE O/P EST LOW 20 MIN: CPT

## 2024-11-05 RX ORDER — LANOLIN ALCOHOL/MO/W.PET/CERES
1000 CREAM (GRAM) TOPICAL DAILY
COMMUNITY

## 2024-11-05 NOTE — PROGRESS NOTES
Ambulatory Visit  Name: Frank G Ruzicka      : 1960      MRN: [id number]  Encounter Provider: Elsi Noonan DO  Encounter Date: 2024   Encounter department: [facility]    Assessment & Plan  Poor short term memory  Vitamin B12 borderline low at 473  Advised patient to take vitamin B12 1000 mcg daily   Previously provided neuropsych referral to patient for further evaluation- reports had difficulty setting up apt at one office and was advised to call insurance to figure out where he can go next   Depression and anxiety positive in office today, may be contributing to short term memory; also has history of concussions and head injuries while in the . Patient defers medication or therapy at this time. Provided brief counseling.        Left upper arm pain  Osteoarthritis of left arm   Saw orthopedic surgery who advised steroid injections but patient declined   Continue naproxen and voltaren gel as needed  Patient previously completed PT for this and does not want referral for this again   Advised to continue exercise and stretching        Mild depression  Depression Screening Follow-up Plan: Patient's depression screening was positive with a PHQ-2 score of 3. Their PHQ-9 score was 8. Patient assessed for underlying major depression. They have no active suicidal ideations. Brief counseling provided and recommend additional follow-up/re-evaluation next office visit. Patient declines further evaluation by mental health professional and/or medications. They have no active suicidal ideations. Brief counseling provided and recommend additional follow-up/re-evaluation at next office visit. Patient advised to follow-up with PCP for further management.         Moderate anxiety  FRANCISCO was 10 in office today   Brief counseling provided in office today   Patient declined medication management or therapy at this time   Provided stress management techniques   Advised to call/return if  would like to pursue medication or therapy          Depression Screening and Follow-up Plan: Patient's depression screening was positive with a PHQ-2 score of 3. Their PHQ-9 score was 8. Patient assessed for underlying major depression. Brief counseling provided and recommend additional follow-up/re-evaluation next office visit. Patient declines further evaluation by mental health professional and/or medications. Brief counseling provided. Will re-evaluate at next office visit. Patient advised to follow-up with PCP for further management.         Physical Activity Assessment and Intervention:    Reviewed and updated Physical Activity Prescription with patient      Emotional and Mental Well-being, Sleep, Connectedness Assessment and Intervention:    Sleep/stress assessment performed    Depression and anxiety screening performed and reviewed    PHQ-9 or GAD7 performed for initial evaluation or follow-up    Stress management plan created with patient       History of Present Illness     HPI  Patient is a 64-year-old male with past medical history of GERD, MARY, osteoarthritis that presents to the clinic today for follow-up of left shoulder pain and poor short-term memory.  Patient reports that his left arm pain has somewhat improved with naproxen and Voltaren gel.  He continues to exercise as tolerated.  Is not interested in physical therapy at this time.  Reports seeing orthopedic surgeon who recommended steroid injection, but patient does not wish to pursue this at this time.  Patient also reports continued poor short-term memory and has associated depression and anxiety surrounding this.  Also has increased life stressors with wife who is in a lot of pain from her chronic conditions.  Patient saw therapist in the past but is not interested in pursuing this at this time.  Patient also not interested in medications for anxiety and depression.  Denies chest pain, shortness of breath, suicidal ideation, NVD.    History  obtained from : patient  Review of Systems   Constitutional:  Negative for chills and fever.   HENT:  Negative for ear pain and sore throat.    Eyes:  Negative for pain and visual disturbance.   Respiratory:  Negative for cough and shortness of breath.    Cardiovascular:  Negative for chest pain and palpitations.   Gastrointestinal:  Negative for abdominal pain and vomiting.   Genitourinary:  Negative for dysuria and hematuria.   Musculoskeletal:  Negative for arthralgias and back pain.        Left shoulder pain   Skin:  Negative for color change and rash.   Neurological:  Negative for seizures and syncope.        Poor short-term memory   Psychiatric/Behavioral:  Negative for confusion and suicidal ideas. The patient is nervous/anxious.    All other systems reviewed and are negative.    Current Outpatient Medications on File Prior to Visit   Medication Sig Dispense Refill    fluticasone (FLONASE) 50 mcg/act nasal spray       linaCLOtide (Linzess) 72 MCG CAPS Take 72 mcg by mouth in the morning 1/2 hour before breakfast 90 capsule 1    Respiratory Therapy Supplies (CareTouch CPAP & BIPAP Hose) MISC Use      vitamin B-12 (VITAMIN B-12) 1,000 mcg tablet Take 1,000 mcg by mouth daily      naproxen (Naprosyn) 500 mg tablet Take 1 tablet (500 mg total) by mouth 2 (two) times a day with meals 28 tablet 0    [DISCONTINUED] polyethylene glycol-electrolytes (TriLyte) 4000 mL solution Take 4,000 mL by mouth once for 1 dose Take 4000 mL by mouth once for 1 dose. Use as directed (Patient not taking: Reported on 3/21/2024) 4000 mL 0     No current facility-administered medications on file prior to visit.      Social History     Tobacco Use    Smoking status: Never    Smokeless tobacco: Never   Vaping Use    Vaping status: Never Used   Substance and Sexual Activity    Alcohol use: No    Drug use: No    Sexual activity: Yes     Partners: Female     Birth control/protection: None         Objective     /64   Pulse 72   Temp  98.2 °F (36.8 °C)   Wt 131 kg (289 lb)   SpO2 97%   BMI 42.68 kg/m²     Physical Exam  Vitals reviewed.   Constitutional:       Appearance: Normal appearance.   HENT:      Head: Normocephalic and atraumatic.      Right Ear: External ear normal.      Left Ear: External ear normal.      Nose: Nose normal.      Mouth/Throat:      Pharynx: Oropharynx is clear.   Eyes:      Extraocular Movements: Extraocular movements intact.      Conjunctiva/sclera: Conjunctivae normal.      Pupils: Pupils are equal, round, and reactive to light.   Cardiovascular:      Rate and Rhythm: Normal rate and regular rhythm.      Pulses: Normal pulses.      Heart sounds: Normal heart sounds.   Pulmonary:      Effort: Pulmonary effort is normal.      Breath sounds: Normal breath sounds.   Abdominal:      General: Abdomen is flat.      Palpations: Abdomen is soft.   Musculoskeletal:      Cervical back: Neck supple.      Right lower leg: No edema.      Left lower leg: No edema.   Skin:     General: Skin is warm.      Capillary Refill: Capillary refill takes less than 2 seconds.   Neurological:      General: No focal deficit present.      Mental Status: He is alert and oriented to person, place, and time. Mental status is at baseline.   Psychiatric:         Thought Content: Thought content normal.         Judgment: Judgment normal.      Comments: Somewhat anxious

## 2024-11-05 NOTE — ASSESSMENT & PLAN NOTE
Depression Screening Follow-up Plan: Patient's depression screening was positive with a PHQ-2 score of 3. Their PHQ-9 score was 8. Patient assessed for underlying major depression. They have no active suicidal ideations. Brief counseling provided and recommend additional follow-up/re-evaluation next office visit. Patient declines further evaluation by mental health professional and/or medications. They have no active suicidal ideations. Brief counseling provided and recommend additional follow-up/re-evaluation at next office visit. Patient advised to follow-up with PCP for further management.

## 2024-11-05 NOTE — ASSESSMENT & PLAN NOTE
Osteoarthritis of left arm   Saw orthopedic surgery who advised steroid injections but patient declined   Continue naproxen and voltaren gel as needed  Patient previously completed PT for this and does not want referral for this again   Advised to continue exercise and stretching

## 2024-11-05 NOTE — ASSESSMENT & PLAN NOTE
Vitamin B12 borderline low at 473  Advised patient to take vitamin B12 1000 mcg daily   Previously provided neuropsych referral to patient for further evaluation- reports had difficulty setting up apt at one office and was advised to call insurance to figure out where he can go next   Depression and anxiety positive in office today, may be contributing to short term memory; also has history of concussions and head injuries while in the . Patient defers medication or therapy at this time. Provided brief counseling.

## 2024-11-05 NOTE — ASSESSMENT & PLAN NOTE
FRANCISCO was 10 in office today   Brief counseling provided in office today   Patient declined medication management or therapy at this time   Provided stress management techniques   Advised to call/return if would like to pursue medication or therapy

## 2024-11-14 DIAGNOSIS — K59.04 CHRONIC IDIOPATHIC CONSTIPATION: ICD-10-CM

## 2024-11-14 RX ORDER — LINACLOTIDE 72 UG/1
CAPSULE, GELATIN COATED ORAL
Qty: 90 CAPSULE | Refills: 1 | Status: SHIPPED | OUTPATIENT
Start: 2024-11-14

## 2025-04-19 DIAGNOSIS — K59.04 CHRONIC IDIOPATHIC CONSTIPATION: ICD-10-CM

## 2025-04-21 RX ORDER — LINACLOTIDE 72 UG/1
CAPSULE, GELATIN COATED ORAL
Qty: 90 CAPSULE | Refills: 1 | Status: SHIPPED | OUTPATIENT
Start: 2025-04-21